# Patient Record
Sex: FEMALE | Race: WHITE | NOT HISPANIC OR LATINO | Employment: FULL TIME | URBAN - METROPOLITAN AREA
[De-identification: names, ages, dates, MRNs, and addresses within clinical notes are randomized per-mention and may not be internally consistent; named-entity substitution may affect disease eponyms.]

---

## 2017-02-23 ENCOUNTER — OFFICE VISIT - HEALTHEAST (OUTPATIENT)
Dept: FAMILY MEDICINE | Facility: CLINIC | Age: 52
End: 2017-02-23

## 2017-02-23 DIAGNOSIS — Z97.5 IUD (INTRAUTERINE DEVICE) IN PLACE: ICD-10-CM

## 2017-02-23 DIAGNOSIS — Z13.9 SCREENING: ICD-10-CM

## 2017-02-23 DIAGNOSIS — F41.9 ANXIETY: ICD-10-CM

## 2017-02-23 ASSESSMENT — MIFFLIN-ST. JEOR: SCORE: 1537.09

## 2017-03-23 ENCOUNTER — OFFICE VISIT - HEALTHEAST (OUTPATIENT)
Dept: FAMILY MEDICINE | Facility: CLINIC | Age: 52
End: 2017-03-23

## 2017-03-23 DIAGNOSIS — F32.89 DEPRESSIVE DISORDER, NOT ELSEWHERE CLASSIFIED: ICD-10-CM

## 2017-03-23 DIAGNOSIS — F41.9 ANXIETY: ICD-10-CM

## 2017-03-23 DIAGNOSIS — Z00.00 ROUTINE GENERAL MEDICAL EXAMINATION AT A HEALTH CARE FACILITY: ICD-10-CM

## 2017-03-23 LAB
CHOLEST SERPL-MCNC: 216 MG/DL
FASTING STATUS PATIENT QL REPORTED: YES
HDLC SERPL-MCNC: 60 MG/DL
LDLC SERPL CALC-MCNC: 145 MG/DL
TRIGL SERPL-MCNC: 53 MG/DL

## 2017-03-23 ASSESSMENT — MIFFLIN-ST. JEOR: SCORE: 1525.86

## 2017-03-30 ENCOUNTER — RECORDS - HEALTHEAST (OUTPATIENT)
Dept: ADMINISTRATIVE | Facility: OTHER | Age: 52
End: 2017-03-30

## 2017-03-31 LAB
HPV INTERPRETATION - HISTORICAL: ABNORMAL
HPV INTERPRETER - HISTORICAL: ABNORMAL

## 2017-04-03 LAB
BKR LAB AP ABNORMAL BLEEDING: NO
BKR LAB AP BIRTH CONTROL/HORMONES: ABNORMAL
BKR LAB AP CERVICAL APPEARANCE: NORMAL
BKR LAB AP GYN ADEQUACY: ABNORMAL
BKR LAB AP GYN INTERPRETATION: ABNORMAL
BKR LAB AP HPV REFLEX: ABNORMAL
BKR LAB AP LMP: ABNORMAL
BKR LAB AP PATIENT STATUS: ABNORMAL
BKR LAB AP PREVIOUS ABNORMAL: NO
BKR LAB AP PREVIOUS NORMAL: NO
HIGH RISK?: NO
PATH REPORT.COMMENTS IMP SPEC: ABNORMAL
RESULT FLAG (HE HISTORICAL CONVERSION): ABNORMAL

## 2017-04-10 ENCOUNTER — AMBULATORY - HEALTHEAST (OUTPATIENT)
Dept: FAMILY MEDICINE | Facility: CLINIC | Age: 52
End: 2017-04-10

## 2017-04-10 DIAGNOSIS — R87.619 ABNORMAL PAP SMEAR OF CERVIX: ICD-10-CM

## 2017-04-17 ENCOUNTER — RECORDS - HEALTHEAST (OUTPATIENT)
Dept: ADMINISTRATIVE | Facility: OTHER | Age: 52
End: 2017-04-17

## 2017-05-30 ENCOUNTER — RECORDS - HEALTHEAST (OUTPATIENT)
Dept: ADMINISTRATIVE | Facility: OTHER | Age: 52
End: 2017-05-30

## 2017-12-05 ENCOUNTER — OFFICE VISIT - HEALTHEAST (OUTPATIENT)
Dept: FAMILY MEDICINE | Facility: CLINIC | Age: 52
End: 2017-12-05

## 2017-12-05 DIAGNOSIS — F41.8 DEPRESSION WITH ANXIETY: ICD-10-CM

## 2017-12-05 DIAGNOSIS — Z12.31 VISIT FOR SCREENING MAMMOGRAM: ICD-10-CM

## 2018-01-11 ENCOUNTER — COMMUNICATION - HEALTHEAST (OUTPATIENT)
Dept: FAMILY MEDICINE | Facility: CLINIC | Age: 53
End: 2018-01-11

## 2018-01-11 DIAGNOSIS — F33.9 MAJOR DEPRESSIVE DISORDER, RECURRENT EPISODE (H): ICD-10-CM

## 2018-01-23 ENCOUNTER — RECORDS - HEALTHEAST (OUTPATIENT)
Dept: ADMINISTRATIVE | Facility: OTHER | Age: 53
End: 2018-01-23

## 2018-02-27 ENCOUNTER — COMMUNICATION - HEALTHEAST (OUTPATIENT)
Dept: ADMINISTRATIVE | Facility: CLINIC | Age: 53
End: 2018-02-27

## 2018-03-15 ENCOUNTER — OFFICE VISIT - HEALTHEAST (OUTPATIENT)
Dept: FAMILY MEDICINE | Facility: CLINIC | Age: 53
End: 2018-03-15

## 2018-03-15 DIAGNOSIS — F33.9 MAJOR DEPRESSIVE DISORDER, RECURRENT EPISODE (H): ICD-10-CM

## 2018-08-08 ENCOUNTER — OFFICE VISIT - HEALTHEAST (OUTPATIENT)
Dept: FAMILY MEDICINE | Facility: CLINIC | Age: 53
End: 2018-08-08

## 2018-08-08 DIAGNOSIS — F33.9 MAJOR DEPRESSIVE DISORDER, RECURRENT EPISODE (H): ICD-10-CM

## 2018-08-08 DIAGNOSIS — F41.9 ANXIETY: ICD-10-CM

## 2018-09-25 ENCOUNTER — COMMUNICATION - HEALTHEAST (OUTPATIENT)
Dept: FAMILY MEDICINE | Facility: CLINIC | Age: 53
End: 2018-09-25

## 2018-09-25 DIAGNOSIS — F33.9 MAJOR DEPRESSIVE DISORDER, RECURRENT EPISODE (H): ICD-10-CM

## 2018-11-29 ENCOUNTER — OFFICE VISIT - HEALTHEAST (OUTPATIENT)
Dept: FAMILY MEDICINE | Facility: CLINIC | Age: 53
End: 2018-11-29

## 2018-11-29 DIAGNOSIS — N95.1 PERIMENOPAUSE: ICD-10-CM

## 2018-11-29 DIAGNOSIS — R87.610 ASCUS WITH POSITIVE HIGH RISK HPV CERVICAL: ICD-10-CM

## 2018-11-29 DIAGNOSIS — F33.1 MODERATE EPISODE OF RECURRENT MAJOR DEPRESSIVE DISORDER (H): ICD-10-CM

## 2018-11-29 DIAGNOSIS — R87.810 ASCUS WITH POSITIVE HIGH RISK HPV CERVICAL: ICD-10-CM

## 2018-11-29 DIAGNOSIS — F41.9 ANXIETY: ICD-10-CM

## 2018-11-29 ASSESSMENT — MIFFLIN-ST. JEOR: SCORE: 1574.4

## 2018-12-02 ENCOUNTER — COMMUNICATION - HEALTHEAST (OUTPATIENT)
Dept: FAMILY MEDICINE | Facility: CLINIC | Age: 53
End: 2018-12-02

## 2018-12-03 ENCOUNTER — AMBULATORY - HEALTHEAST (OUTPATIENT)
Dept: FAMILY MEDICINE | Facility: CLINIC | Age: 53
End: 2018-12-03

## 2018-12-03 ENCOUNTER — AMBULATORY - HEALTHEAST (OUTPATIENT)
Dept: LAB | Facility: CLINIC | Age: 53
End: 2018-12-03

## 2018-12-03 DIAGNOSIS — F41.9 ANXIETY: ICD-10-CM

## 2018-12-03 DIAGNOSIS — F33.1 MODERATE EPISODE OF RECURRENT MAJOR DEPRESSIVE DISORDER (H): ICD-10-CM

## 2018-12-03 DIAGNOSIS — N95.1 PERIMENOPAUSE: ICD-10-CM

## 2018-12-03 LAB
FSH SERPL-ACNC: 73.9 MIU/ML
LH SERPL-ACNC: 30.8 MIU/ML
TSH SERPL DL<=0.005 MIU/L-ACNC: 1.47 UIU/ML (ref 0.3–5)

## 2018-12-20 ENCOUNTER — OFFICE VISIT - HEALTHEAST (OUTPATIENT)
Dept: FAMILY MEDICINE | Facility: CLINIC | Age: 53
End: 2018-12-20

## 2018-12-20 DIAGNOSIS — Z12.31 VISIT FOR SCREENING MAMMOGRAM: ICD-10-CM

## 2018-12-20 DIAGNOSIS — R87.610 ASCUS WITH POSITIVE HIGH RISK HPV CERVICAL: ICD-10-CM

## 2018-12-20 DIAGNOSIS — R87.810 ASCUS WITH POSITIVE HIGH RISK HPV CERVICAL: ICD-10-CM

## 2018-12-20 DIAGNOSIS — F41.9 ANXIETY: ICD-10-CM

## 2018-12-20 DIAGNOSIS — F33.1 MODERATE EPISODE OF RECURRENT MAJOR DEPRESSIVE DISORDER (H): ICD-10-CM

## 2018-12-20 RX ORDER — HYDROXYZINE HYDROCHLORIDE 25 MG/1
25-50 TABLET, FILM COATED ORAL 3 TIMES DAILY PRN
Qty: 60 TABLET | Refills: 4 | Status: SHIPPED | OUTPATIENT
Start: 2018-12-20 | End: 2021-10-13

## 2019-01-25 ENCOUNTER — COMMUNICATION - HEALTHEAST (OUTPATIENT)
Dept: FAMILY MEDICINE | Facility: CLINIC | Age: 54
End: 2019-01-25

## 2019-04-11 ENCOUNTER — COMMUNICATION - HEALTHEAST (OUTPATIENT)
Dept: ADMINISTRATIVE | Facility: CLINIC | Age: 54
End: 2019-04-11

## 2019-04-26 ENCOUNTER — OFFICE VISIT - HEALTHEAST (OUTPATIENT)
Dept: FAMILY MEDICINE | Facility: CLINIC | Age: 54
End: 2019-04-26

## 2019-04-26 DIAGNOSIS — R87.810 ASCUS WITH POSITIVE HIGH RISK HPV CERVICAL: ICD-10-CM

## 2019-04-26 DIAGNOSIS — R87.610 ASCUS WITH POSITIVE HIGH RISK HPV CERVICAL: ICD-10-CM

## 2019-04-26 DIAGNOSIS — F33.1 MODERATE EPISODE OF RECURRENT MAJOR DEPRESSIVE DISORDER (H): ICD-10-CM

## 2019-04-26 DIAGNOSIS — F41.9 ANXIETY: ICD-10-CM

## 2020-01-19 ENCOUNTER — COMMUNICATION - HEALTHEAST (OUTPATIENT)
Dept: FAMILY MEDICINE | Facility: CLINIC | Age: 55
End: 2020-01-19

## 2020-01-19 DIAGNOSIS — F33.1 MODERATE EPISODE OF RECURRENT MAJOR DEPRESSIVE DISORDER (H): ICD-10-CM

## 2020-01-19 DIAGNOSIS — F41.9 ANXIETY: ICD-10-CM

## 2020-02-28 ENCOUNTER — COMMUNICATION - HEALTHEAST (OUTPATIENT)
Dept: FAMILY MEDICINE | Facility: CLINIC | Age: 55
End: 2020-02-28

## 2020-02-28 DIAGNOSIS — F41.9 ANXIETY: ICD-10-CM

## 2020-02-28 DIAGNOSIS — F33.1 MODERATE EPISODE OF RECURRENT MAJOR DEPRESSIVE DISORDER (H): ICD-10-CM

## 2020-03-10 ENCOUNTER — OFFICE VISIT - HEALTHEAST (OUTPATIENT)
Dept: FAMILY MEDICINE | Facility: CLINIC | Age: 55
End: 2020-03-10

## 2020-03-10 DIAGNOSIS — F41.9 ANXIETY: ICD-10-CM

## 2020-03-10 DIAGNOSIS — N95.1 PERIMENOPAUSE: ICD-10-CM

## 2020-03-10 DIAGNOSIS — Z12.4 SCREENING FOR CERVICAL CANCER: ICD-10-CM

## 2020-03-10 DIAGNOSIS — R87.810 ASCUS WITH POSITIVE HIGH RISK HPV CERVICAL: ICD-10-CM

## 2020-03-10 DIAGNOSIS — R87.610 ASCUS WITH POSITIVE HIGH RISK HPV CERVICAL: ICD-10-CM

## 2020-03-10 DIAGNOSIS — F33.1 MODERATE EPISODE OF RECURRENT MAJOR DEPRESSIVE DISORDER (H): ICD-10-CM

## 2020-03-10 DIAGNOSIS — Z00.00 ROUTINE GENERAL MEDICAL EXAMINATION AT A HEALTH CARE FACILITY: ICD-10-CM

## 2020-03-10 RX ORDER — CITALOPRAM HYDROBROMIDE 10 MG/1
TABLET ORAL
Qty: 90 TABLET | Refills: 2 | Status: SHIPPED | OUTPATIENT
Start: 2020-03-10 | End: 2021-10-13

## 2020-03-10 ASSESSMENT — ANXIETY QUESTIONNAIRES
2. NOT BEING ABLE TO STOP OR CONTROL WORRYING: MORE THAN HALF THE DAYS
GAD7 TOTAL SCORE: 11
5. BEING SO RESTLESS THAT IT IS HARD TO SIT STILL: SEVERAL DAYS
IF YOU CHECKED OFF ANY PROBLEMS ON THIS QUESTIONNAIRE, HOW DIFFICULT HAVE THESE PROBLEMS MADE IT FOR YOU TO DO YOUR WORK, TAKE CARE OF THINGS AT HOME, OR GET ALONG WITH OTHER PEOPLE: SOMEWHAT DIFFICULT
7. FEELING AFRAID AS IF SOMETHING AWFUL MIGHT HAPPEN: MORE THAN HALF THE DAYS
3. WORRYING TOO MUCH ABOUT DIFFERENT THINGS: SEVERAL DAYS
1. FEELING NERVOUS, ANXIOUS, OR ON EDGE: MORE THAN HALF THE DAYS
6. BECOMING EASILY ANNOYED OR IRRITABLE: MORE THAN HALF THE DAYS
4. TROUBLE RELAXING: SEVERAL DAYS

## 2020-03-10 ASSESSMENT — PATIENT HEALTH QUESTIONNAIRE - PHQ9: SUM OF ALL RESPONSES TO PHQ QUESTIONS 1-9: 14

## 2020-03-10 ASSESSMENT — MIFFLIN-ST. JEOR: SCORE: 1567.14

## 2020-03-11 LAB
HPV SOURCE: ABNORMAL
HUMAN PAPILLOMA VIRUS 16 DNA: POSITIVE
HUMAN PAPILLOMA VIRUS 18 DNA: NEGATIVE
HUMAN PAPILLOMA VIRUS FINAL DIAGNOSIS: ABNORMAL
HUMAN PAPILLOMA VIRUS OTHER HR: POSITIVE
SPECIMEN DESCRIPTION: ABNORMAL

## 2020-03-19 LAB
BKR LAB AP ABNORMAL BLEEDING: NO
BKR LAB AP BIRTH CONTROL/HORMONES: ABNORMAL
BKR LAB AP CERVICAL APPEARANCE: NORMAL
BKR LAB AP GYN ADEQUACY: ABNORMAL
BKR LAB AP GYN INTERPRETATION: ABNORMAL
BKR LAB AP HPV REFLEX: ABNORMAL
BKR LAB AP LMP: ABNORMAL
BKR LAB AP PATIENT STATUS: ABNORMAL
BKR LAB AP PREVIOUS ABNORMAL: ABNORMAL
BKR LAB AP PREVIOUS NORMAL: 2013
HIGH RISK?: NO
PATH REPORT.COMMENTS IMP SPEC: ABNORMAL
RESULT FLAG (HE HISTORICAL CONVERSION): ABNORMAL

## 2020-03-24 ENCOUNTER — COMMUNICATION - HEALTHEAST (OUTPATIENT)
Dept: FAMILY MEDICINE | Facility: CLINIC | Age: 55
End: 2020-03-24

## 2020-03-26 ENCOUNTER — OFFICE VISIT - HEALTHEAST (OUTPATIENT)
Dept: FAMILY MEDICINE | Facility: CLINIC | Age: 55
End: 2020-03-26

## 2020-03-26 DIAGNOSIS — Z30.432 ENCOUNTER FOR IUD REMOVAL: ICD-10-CM

## 2020-03-26 DIAGNOSIS — Z00.00 ROUTINE GENERAL MEDICAL EXAMINATION AT A HEALTH CARE FACILITY: ICD-10-CM

## 2020-03-26 LAB
CHOLEST SERPL-MCNC: 281 MG/DL
ERYTHROCYTE [DISTWIDTH] IN BLOOD BY AUTOMATED COUNT: 13 % (ref 11–14.5)
FASTING STATUS PATIENT QL REPORTED: YES
FASTING STATUS PATIENT QL REPORTED: YES
GLUCOSE BLD-MCNC: 77 MG/DL (ref 70–99)
HCT VFR BLD AUTO: 42.1 % (ref 35–47)
HDLC SERPL-MCNC: 62 MG/DL
HGB BLD-MCNC: 13.9 G/DL (ref 12–16)
LDLC SERPL CALC-MCNC: 206 MG/DL
MCH RBC QN AUTO: 29.4 PG (ref 27–34)
MCHC RBC AUTO-ENTMCNC: 33 G/DL (ref 32–36)
MCV RBC AUTO: 89 FL (ref 80–100)
PLATELET # BLD AUTO: 239 THOU/UL (ref 140–440)
PMV BLD AUTO: 10.9 FL (ref 8.5–12.5)
RBC # BLD AUTO: 4.73 MILL/UL (ref 3.8–5.4)
TRIGL SERPL-MCNC: 66 MG/DL
WBC: 5.9 THOU/UL (ref 4–11)

## 2020-06-24 ENCOUNTER — HOSPITAL ENCOUNTER (OUTPATIENT)
Dept: MAMMOGRAPHY | Facility: CLINIC | Age: 55
Discharge: HOME | End: 2020-06-24
Attending: FAMILY MEDICINE

## 2020-06-24 DIAGNOSIS — Z00.00 ROUTINE GENERAL MEDICAL EXAMINATION AT A HEALTH CARE FACILITY: ICD-10-CM

## 2020-08-06 ENCOUNTER — COMMUNICATION - HEALTHEAST (OUTPATIENT)
Dept: FAMILY MEDICINE | Facility: CLINIC | Age: 55
End: 2020-08-06

## 2021-02-09 ENCOUNTER — COMMUNICATION - HEALTHEAST (OUTPATIENT)
Dept: SCHEDULING | Facility: CLINIC | Age: 56
End: 2021-02-09

## 2021-03-04 ENCOUNTER — COMMUNICATION - HEALTHEAST (OUTPATIENT)
Dept: FAMILY MEDICINE | Facility: CLINIC | Age: 56
End: 2021-03-04

## 2021-03-04 DIAGNOSIS — F33.1 MODERATE EPISODE OF RECURRENT MAJOR DEPRESSIVE DISORDER (H): ICD-10-CM

## 2021-03-04 DIAGNOSIS — F41.9 ANXIETY: ICD-10-CM

## 2021-03-04 RX ORDER — CITALOPRAM HYDROBROMIDE 20 MG/1
TABLET ORAL
Qty: 90 TABLET | Refills: 0 | Status: SHIPPED | OUTPATIENT
Start: 2021-03-04 | End: 2021-10-13

## 2021-04-13 ENCOUNTER — OFFICE VISIT - HEALTHEAST (OUTPATIENT)
Dept: FAMILY MEDICINE | Facility: CLINIC | Age: 56
End: 2021-04-13

## 2021-04-13 DIAGNOSIS — F33.1 MODERATE EPISODE OF RECURRENT MAJOR DEPRESSIVE DISORDER (H): ICD-10-CM

## 2021-04-13 DIAGNOSIS — F41.9 ANXIETY: ICD-10-CM

## 2021-04-13 DIAGNOSIS — R87.610 ASCUS WITH POSITIVE HIGH RISK HPV CERVICAL: ICD-10-CM

## 2021-04-13 DIAGNOSIS — R87.810 ASCUS WITH POSITIVE HIGH RISK HPV CERVICAL: ICD-10-CM

## 2021-04-13 DIAGNOSIS — Z78.0 POST-MENOPAUSE: ICD-10-CM

## 2021-04-13 RX ORDER — BUPROPION HYDROCHLORIDE 150 MG/1
150 TABLET ORAL EVERY MORNING
Qty: 30 TABLET | Refills: 2 | Status: SHIPPED | OUTPATIENT
Start: 2021-04-13 | End: 2022-10-03 | Stop reason: DRUGHIGH

## 2021-04-13 ASSESSMENT — ANXIETY QUESTIONNAIRES
4. TROUBLE RELAXING: SEVERAL DAYS
5. BEING SO RESTLESS THAT IT IS HARD TO SIT STILL: SEVERAL DAYS
IF YOU CHECKED OFF ANY PROBLEMS ON THIS QUESTIONNAIRE, HOW DIFFICULT HAVE THESE PROBLEMS MADE IT FOR YOU TO DO YOUR WORK, TAKE CARE OF THINGS AT HOME, OR GET ALONG WITH OTHER PEOPLE: SOMEWHAT DIFFICULT
3. WORRYING TOO MUCH ABOUT DIFFERENT THINGS: MORE THAN HALF THE DAYS
6. BECOMING EASILY ANNOYED OR IRRITABLE: MORE THAN HALF THE DAYS
1. FEELING NERVOUS, ANXIOUS, OR ON EDGE: SEVERAL DAYS
2. NOT BEING ABLE TO STOP OR CONTROL WORRYING: MORE THAN HALF THE DAYS
GAD7 TOTAL SCORE: 10
7. FEELING AFRAID AS IF SOMETHING AWFUL MIGHT HAPPEN: SEVERAL DAYS

## 2021-04-13 ASSESSMENT — PATIENT HEALTH QUESTIONNAIRE - PHQ9: SUM OF ALL RESPONSES TO PHQ QUESTIONS 1-9: 10

## 2021-05-21 ENCOUNTER — RECORDS - HEALTHEAST (OUTPATIENT)
Dept: ADMINISTRATIVE | Facility: OTHER | Age: 56
End: 2021-05-21

## 2021-05-27 ASSESSMENT — PATIENT HEALTH QUESTIONNAIRE - PHQ9
SUM OF ALL RESPONSES TO PHQ QUESTIONS 1-9: 10
SUM OF ALL RESPONSES TO PHQ QUESTIONS 1-9: 14

## 2021-05-28 ASSESSMENT — ANXIETY QUESTIONNAIRES
GAD7 TOTAL SCORE: 10
GAD7 TOTAL SCORE: 11

## 2021-05-28 NOTE — PROGRESS NOTES
"Assessment/plan   Kaley Acevedo is a 53 y.o. female who is established to my practice.    Kaley was seen today for medication refill.    Diagnoses and all orders for this visit:    Moderate episode of recurrent major depressive disorder (H)  Anxiety  PHQ-9 Total Score: 10 (4/26/2019 12:00 PM)    NEENA-7 Total: 10 (4/26/2019 12:00 PM)  NEENA 7 Total Score: 6 (12/20/2018  3:00 PM)    Reports stability in her symptoms.   Reviewed options for hormone replacement therapy given her mood sx, hot flashes, weight gain, etc. She will think about these.   Mindfulness reviewed.  Encouraged exercise.  Encouraged reconsideration for counseling but at this time she declines.  Refill sent of her Celexa and patient understands she is taking this 30 mg and 20 mg on alternating days.  She is going to consider the hydroxyzine this time.    ASCUS with positive high risk HPV cervical  3/2017 pap ASCUS with HPV+  4/17/17- colposcopy with [facility] *see media tab; showed normal visually and bx reported \"cells with HPV effect, very mild, nondiagnostic for dysplasia\".    - Discussed with pt today and she agrees to have pap/cotesting repeated as this should have been done 1 year after colp.        I spent 25 minutes with the patient, >50% of which was in counseling regarding patient's medical issues as noted above.    Follow up: anytime for pap    Leatha Bernal MD    Subjective:      HPI: Kaley Acevedo is a 53 y.o. female who is here for:    Chief Complaint   Patient presents with     Medication Refill     no concerns        Med check:   Started walking more now that the weather is warmer.   She states taking the Celexa at 30 mg every single day because worsening in her apathy.  So for the past month she has been doing this alternating dosing: Has been taking Celexa 30mg every other day and 20mg on alternating days for the past month and going better.    Counseling because this has not gone well for her in the past.  She has not tried the " hydroxyzine but does recall picking it up.  Denies suicidal thoughts/ideations.   She is mostly frustrated with her hormonal mood swings and hot flashes.  She is not yet ready for hormone replacement therapy but interested to hear a little bit more about this today.    Discussed her abnormal Pa/colp from 2017, due for retesting, and she is agreeable to come back in a few weeks for this.    Review of Systems:  12 point comprehensive review of systems was negative except as noted and HPI     Social History:  Social History     Social History Narrative     but currently in another relationship. Works at [employer]. 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 No tobacco use. Drinks 1-3 alcoholic beverages a week.     Letaha Bernal MD       Medical History:  Patient Active Problem List   Diagnosis     Urge And Stress Incontinence     Hyperlipidemia     Obesity     Chronic Major Depression     Anxiety     IUD (intrauterine device) in place     Family history of colon cancer     Polyp of colon     ASCUS with positive high risk HPV cervical     Past Medical History:   Diagnosis Date     Anxiety      Benign Adenomatous Polyp Of The Large Intestine     Created by [facility] Annotation: Mar 26 2007  1:03PM - Vivian Alvarado: colonscopy Q 5  years      Depression      Hypercholesteremia      Past Surgical History:   Procedure Laterality Date     APPENDECTOMY  spring 2011      SECTION  1991     [place]  DELIVERY ONLY      Description:  Section;  Recorded: 2008;     NV HEMORRHOIDECTOMY INTERNAL RUBBER BAND LIGATIONS      Description: Hemorrhoidectomy;  Recorded: 2008;     Patient has no known allergies.  Family History   Problem Relation Age of Onset     Myelodysplastic syndrome Father      Skin cancer Mother      Diabetes Mother      Breast cancer Mother      Hypertension Sister      Hyperlipidemia Sister      Colon cancer Sister 32          6 months later     Hypertension Brother      Hyperlipidemia Brother        Medications:  Current Outpatient Medications   Medication Sig Dispense Refill     citalopram (CELEXA) 10 MG tablet Take 10mg with the 20mg dose for total of 30mg daily. 90 tablet 2     citalopram (CELEXA) 20 MG tablet Take 20mg with the 10mg tablet for total of 30mg daily. 90 tablet 2     LEVONORGESTREL (MIRENA UTRN) by Intrauterine route.       multivitamin capsule Take 1 capsule by mouth daily.       cholecalciferol, vitamin D3, 1,000 unit tablet Take 1,000 Units by mouth daily.       hydrOXYzine HCl (ATARAX) 25 MG tablet Take 1-2 tablets (25-50 mg total) by mouth 3 (three) times a day as needed for anxiety. 60 tablet 4     No current facility-administered medications for this visit.        Imaging & Labs reviewed this visit: pap as described in HPI and A/P      Objective:      Vitals:    19 1146   BP: 116/70   Pulse: 60       Physical Exam:     General: Alert, no acute distress.   HEENT: normocephalic conjunctivae are clear. EOMI  Neck: supple   Lungs: Normal effort  Heart: regular rate  Abdomen: soft   Skin: clear without rash or lesions  Neuro: alert, oriented  Psych: mood good, affect appropriate, good eye contact, insight and judgment intact, normal speech pattern.    PHQ9 score PHQ-9 Total Score: 10 (2019 12:00 PM)    Little interest or pleasure in doing things: Several days  Feeling down, depressed, or hopeless: Several days  Trouble falling or staying asleep, or sleeping too much: More than half the days  Feeling tired or having little energy: More than half the days  Poor appetite or overeating: More than half the days  Feeling bad about yourself - or that you are a failure or have let yourself or your family down: Several days  Trouble concentrating on things, such as reading the newspaper or watching television: Several days  Moving or speaking so slowly that other people could have noticed. Or the opposite -  being so fidgety or restless that you have been moving around a lot more than usual: Not at all  Thoughts that you would be better off dead, or of hurting yourself in some way: Not at all  PHQ-9 Total Score: 10    GAD7 score NEENA-7 Total: 10 (4/26/2019 12:00 PM)  NEENA 7 Total Score: 6 (12/20/2018  3:00 PM)    No data recorded      Leatha Bernal MD

## 2021-05-28 NOTE — PATIENT INSTRUCTIONS - HE
Hormone Replacement Therapy (HRT):  In the past, MHT was also often prescribed for prevention of coronary heart disease and osteoporosis, however in analysis of 18 trials HRT was associated with a number of adverse outcomes, including an excess risk of coronary heart disease, stroke, venous thromboembolism (DVT and pulmonary embolism), and breast cancer.  Many expert groups do suggest HRT for younger postmenopausal women (age 50-59) with moderate to severe symptoms and no contraindications to estrogen use. Women should be reassured that the absolute risk of complications for healthy, young postmenopausal women taking HRT for five years is very low.    For young post-menopausal/perimenopausal women:  Combined estrogen-progestin therapy - Number of cases (additional or fewer) per 1000 women per five years of hormone use when compared with placebo:   - Coronary heart disease (CHD) - 2.5 additional cases   - Invasive breast cancer - 3 additional cases   - Stroke - 2.5 additional cases   - Pulmonary embolism - 3 additional cases   - Colorectal cancer - 0.5 fewer cases   - Endometrial cancer - no difference   - Hip fracture - 1.5 fewer cases   - All-cause mortality - 5 fewer events  Estrogen-alone therapy - Number of cases (additional or fewer) per 1000 women per five years of hormone use when compared with placebo:   - CHD - 5.5 fewer cases   - Invasive breast cancer - 2.5 fewer cases   - Stroke - 0.5 fewer cases   - Pulmonary embolism - 1.5 additional cases   - Colorectal cancer - 0.5 fewer cases   - Hip fracture - 1.5 additional cases   - All-cause mortality - 5.5 fewer events    COGNITIVE FUNCTION AND DEMENTIA:  - Although some epidemiologic studies suggested that estrogen may preserve cognitive function and prevent dementia, data from the Women's Health Initiative did not support these observations.   GALLBLADDER DISEASE:  - A secondary analysis of data from the Women's Health Initiative found a significantly  increased risk of biliary tract disease among women using oral estrogen therapy  OSTEOPOROTIC FRACTURE   - The risk of osteoporotic fracture with combined hormone therapy versus placebo was reduced   - For women ages 50 to 59 years, the group most likely to be taking HT, the estimates of benefit in one analysis were 4.9 and 5.9 fewer fractures per 1000 women per five years of combined estrogen-progestin or unopposed estrogen use, respectively  TYPE 2 DIABETES MELLITUS   - Combined HRT appears to reduce the risk of type 2 diabetes mellitus, possibly mediated by a decrease in insulin resistance unrelated to body size. However, this effect is insufficient to recommend HT as a diabetes prevention strategy in women with CHD.  SKIN  - Some clinicians believe that estrogen helps to preserve the thickness and the collagen content of skin in postmenopausal women, but two four-year, randomized trials have reported no differences between MHT and placebo on age-related skin changes (wrinkling and skin rigidity)   WEIGHT  - There is no evidence of an effect of unopposed estrogen or combined estrogen-progestin on body weight or body mass index

## 2021-05-30 ENCOUNTER — RECORDS - HEALTHEAST (OUTPATIENT)
Dept: ADMINISTRATIVE | Facility: CLINIC | Age: 56
End: 2021-05-30

## 2021-05-30 VITALS — BODY MASS INDEX: 28.42 KG/M2 | WEIGHT: 191.9 LBS | HEIGHT: 69 IN

## 2021-05-30 VITALS — WEIGHT: 193.5 LBS | HEIGHT: 69 IN | BODY MASS INDEX: 28.66 KG/M2

## 2021-05-31 VITALS — BODY MASS INDEX: 29.51 KG/M2 | WEIGHT: 198.4 LBS

## 2021-06-02 ENCOUNTER — RECORDS - HEALTHEAST (OUTPATIENT)
Dept: ADMINISTRATIVE | Facility: CLINIC | Age: 56
End: 2021-06-02

## 2021-06-02 VITALS — HEIGHT: 69 IN | BODY MASS INDEX: 30.01 KG/M2 | WEIGHT: 202.6 LBS

## 2021-06-04 VITALS
RESPIRATION RATE: 16 BRPM | WEIGHT: 201 LBS | BODY MASS INDEX: 29.77 KG/M2 | OXYGEN SATURATION: 100 % | DIASTOLIC BLOOD PRESSURE: 78 MMHG | HEIGHT: 69 IN | HEART RATE: 57 BPM | SYSTOLIC BLOOD PRESSURE: 110 MMHG

## 2021-06-04 VITALS
HEART RATE: 56 BPM | WEIGHT: 196.6 LBS | BODY MASS INDEX: 29.24 KG/M2 | DIASTOLIC BLOOD PRESSURE: 62 MMHG | SYSTOLIC BLOOD PRESSURE: 108 MMHG

## 2021-06-05 VITALS — HEART RATE: 72 BPM | DIASTOLIC BLOOD PRESSURE: 72 MMHG | SYSTOLIC BLOOD PRESSURE: 126 MMHG

## 2021-06-05 NOTE — TELEPHONE ENCOUNTER
Refill Approved    Rx renewed per Medication Renewal Policy. Medication was last renewed on 4/26/19.    Francesca Broderick, [facility] Triage/Med Refill 1/20/2020     Requested Prescriptions   Pending Prescriptions Disp Refills     citalopram (CELEXA) 20 MG tablet [Pharmacy Med Name: CITALOPRAM HBR 20 MG TABLET] 90 tablet 2     Sig: TAKE 1 TABLET BY MOUTH, WITH THE 10MG TABLET FOR TOTAL OF 30MG DAILY.       SSRI Refill Protocol  Passed - 1/19/2020  1:14 AM        Passed - PCP or prescribing provider visit in last year     Last office visit with prescriber/PCP: 4/26/2019 Leatha Bernal MD OR same dept: 4/26/2019 Leatha Bernal MD OR same specialty: 4/26/2019 Leatha Bernal MD  Last physical: Visit date not found Last MTM visit: Visit date not found   Next visit within 3 mo: Visit date not found  Next physical within 3 mo: Visit date not found  Prescriber OR PCP: Leatha Bernal MD  Last diagnosis associated with med order: 1. Moderate episode of recurrent major depressive disorder (H)  - citalopram (CELEXA) 20 MG tablet [Pharmacy Med Name: CITALOPRAM HBR 20 MG TABLET]; TAKE 1 TABLET BY MOUTH, WITH THE 10MG TABLET FOR TOTAL OF 30MG DAILY.  Dispense: 90 tablet; Refill: 2    2. Anxiety  - citalopram (CELEXA) 20 MG tablet [Pharmacy Med Name: CITALOPRAM HBR 20 MG TABLET]; TAKE 1 TABLET BY MOUTH, WITH THE 10MG TABLET FOR TOTAL OF 30MG DAILY.  Dispense: 90 tablet; Refill: 2    If protocol passes may refill for 12 months if within 3 months of last provider visit (or a total of 15 months).

## 2021-06-06 NOTE — TELEPHONE ENCOUNTER
Refill Approved    Rx renewed per Medication Renewal Policy. Medication was last renewed on 4/26/19.    Francesca Broderick, [facility] Triage/Med Refill 2/28/2020     Requested Prescriptions   Pending Prescriptions Disp Refills     citalopram (CELEXA) 10 MG tablet [Pharmacy Med Name: CITALOPRAM HBR 10 MG TABLET] 90 tablet 2     Sig: TAKE 1 TABLET BY MOUTH ONCE DAILY, WITH THE 20MG DOSE FOR A TOTAL OF 30MG DAILY.       SSRI Refill Protocol  Passed - 2/28/2020  1:49 AM        Passed - PCP or prescribing provider visit in last year     Last office visit with prescriber/PCP: 4/26/2019 Leatha Bernal MD OR same dept: 4/26/2019 Leatha Bernal MD OR same specialty: 4/26/2019 Leatha Bernal MD  Last physical: Visit date not found Last MTM visit: Visit date not found   Next visit within 3 mo: Visit date not found  Next physical within 3 mo: Visit date not found  Prescriber OR PCP: Leatha Bernal MD  Last diagnosis associated with med order: 1. Moderate episode of recurrent major depressive disorder (H)  - citalopram (CELEXA) 10 MG tablet [Pharmacy Med Name: CITALOPRAM HBR 10 MG TABLET]; TAKE 1 TABLET BY MOUTH ONCE DAILY, WITH THE 20MG DOSE FOR A TOTAL OF 30MG DAILY.  Dispense: 90 tablet; Refill: 2    2. Anxiety  - citalopram (CELEXA) 10 MG tablet [Pharmacy Med Name: CITALOPRAM HBR 10 MG TABLET]; TAKE 1 TABLET BY MOUTH ONCE DAILY, WITH THE 20MG DOSE FOR A TOTAL OF 30MG DAILY.  Dispense: 90 tablet; Refill: 2    If protocol passes may refill for 12 months if within 3 months of last provider visit (or a total of 15 months).

## 2021-06-06 NOTE — PATIENT INSTRUCTIONS - HE
There is a new shingles vaccine that is 97% effective. It is the Shingrix vaccine and is recommended for those 50 and older. We recommend the vaccine even if you have had shingles or the older vaccine against shingles- Zostavax. Insurance coverage for the vaccine varies. I recommend you check with your insurance to verify if, when and where it is covered. The vaccine is covered by most commercial insurance but Medicare does not cover the vaccine. It may be covered by Medicare Part D (your drug/pharmacy plan) and sometimes it is covered only at a pharmacy instead of here in the clinic. If it is covered in the clinic, you may schedule a nurse visit anytime to get the first dose of the vaccine. The second dose is recommended two months after the first and should be gotten by 6 months after the first.   About 10% of people will get a sore, red reaction at the site of the injection. Some people may also feel a little sick or nauseated after the injection. This may happen with either the first and/or second vaccine.

## 2021-06-07 NOTE — TELEPHONE ENCOUNTER
----- Message from Leatha Bernal MD sent at 3/23/2020  7:42 PM CDT -----  Needs colposcopy- ASCUS with HPV16 positive (with other HR HPV also positive). Per current recs, deferring colposcopy due to COVID19 pandemic.     Please update HCM and reach out to pt in the fall to schedule colposcopy/repeat pap. Thanks, Leatha Bernal MD

## 2021-06-07 NOTE — TELEPHONE ENCOUNTER
Patient Returning Call  Reason for call:  Patient called back.  Information relayed to patient:  Informed of message listed below.  Patient states she has an appointment this Thursday and will find out more regarding the results that was read to her then.  Patient has additional questions:  No  If YES, what are your questions/concerns:    Okay to leave a detailed message?: No call back needed

## 2021-06-09 NOTE — PROGRESS NOTES
ASSESSMENT & PLAN:  1. Anxiety    2. IUD (intrauterine device) in place    3. Screening  - Ambulatory referral for Colonoscopy    Discussed options with her.  She does not want to try anything new for now.  We'll start her on citalopram 10 mg daily.  Follow-up in one month.  If she does not have good response from this, we also discussed about BuSpar.  Does not feel comfortable considering psychotherapy.  Continue to reach out to her partner who has been supportive.  She was also encouraged to have her colonoscopy done.  She was agreeable with the plans.     Orders Placed This Encounter   Procedures     Ambulatory referral for Colonoscopy     Referral Priority:   Routine     Referral Type:   Colonoscopy     Referral Reason:   Evaluation and Treatment     Referral Location:   [facility]     Requested Specialty:   Gastroenterology     Number of Visits Requested:   1     Medications Discontinued During This Encounter   Medication Reason     escitalopram oxalate (LEXAPRO) 10 MG tablet Therapy completed        CHIEF COMPLAINT:  Chief Complaint   Patient presents with     Follow-up     Med check- stopped taking Lexapro about 2 months ago, discuss restarting.      Referral     [facility] for colonoscopy, last done 03/22/07.         HISTORY OF PRESENT ILLNESS:  Kaley is a 51 y.o. female presenting to the clinic today to revisit her anxiety and the possibility of medication. She has gotten  and changed jobs within the past year, both of which have been positive changes. She has learned how to manage certain things in her life at certain times, but she still struggles with her anxiety; she has persistent thoughts of self-doubt which are hard to control. She has managed her depression better in the past year, but she is struggling with her anxiety especially at work. She is enjoying her new position at work, but she is having feelings of self-doubt; she has a hard time stopping the cycle of self-doubt, but  only some of the time. She feels she needs help managing it. She is able to function at work and in her relationships. When she is having anxiety, she feels that she needs to get everything done today which she realized is irrational. Her anxiety is not getting worse with time; she feels she is overall better but she can tell that she might get worse without help or medication at this time. She has somebody she can talk to now, a new relationship; she has never talked to anyone and she realizes it is helpful. She has gotten involved in new activities and connected with old friends in the past year. She tried to see a therapist but she did not do well as she had a hard time opening up. She was taking Lexapro to help with her anxiety; she was taking 20 mg at one time but it made her feel emotionally dead. She felt her emotions were numbed at Lexapro 10 mg daily but it was helpful with the anxiety and conversations in her head. She was taking 10 mg every other day but then she was not taking anything for the past 5-6 weeks. She does not want to numb her emotions. She has tried many different medications over time. She denies any concerning side effects from Lexapro, but she felt very uncontrollably emotional when she was taking Wellbutrin. She has also tried Prozac, Xanax, and Celexa. She thinks she took Celexa for a while, and she does not remember any concerning side effects. She thinks she may have tried Zoloft when she was first diagnosed. She would like to try something other than Lexapro to see if there is a product that will not make her emotionally numb; she is nervous to try something too new or untested.  PHQ9 score of 7, GAD7 of 10.     Menorrhagia: She had an IUD placed which has helped with her menorrhagia; she still has some bleeding due to her cyst, but the bleeding is no longer every day.     Health Maintenance: She is going to schedule a colonoscopy this year; her last colonoscopy was done in 2007 and  "she was due for a repeat in 5 years.     REVIEW OF SYSTEMS:   She denies any heart rhythm abnormalities, changes in her stool, abdominal pain, loss of interest or motivation, or thoughts of self-harm. All other systems are negative.     PFSH:     TOBACCO USE:  History   Smoking Status     Never Smoker   Smokeless Tobacco     Never Used        VITALS:  Vitals:    02/23/17 1142   BP: 110/70   Patient Site: Left Arm   Patient Position: Sitting   Cuff Size: Adult Regular   Pulse: (!) 53   SpO2: 100%   Weight: 193 lb 8 oz (87.8 kg)   Height: 5' 9\" (1.753 m)     Wt Readings from Last 3 Encounters:   02/23/17 193 lb 8 oz (87.8 kg)   06/30/16 199 lb 4.8 oz (90.4 kg)   05/16/16 202 lb 11.2 oz (91.9 kg)     Body mass index is 28.57 kg/(m^2).     PHYSICAL EXAM:  Visit Vitals     /70 (Patient Site: Left Arm, Patient Position: Sitting, Cuff Size: Adult Regular)     Pulse (!) 53     Ht 5' 9\" (1.753 m)     Wt 193 lb 8 oz (87.8 kg)     SpO2 100%     Breastfeeding No     BMI 28.57 kg/m2       Vital signs noted above. AAO ×3.  HEENT no nasal discharge, moist oral mucosa.  Neck: Supple neck, nonpalpable cervical lymph nodes. No thyromegaly. Lungs: Clear to auscultation bilateral.  Heart: S1-S2 regular rate and rhythm, no murmurs were noted.  Abdomen: with bowel sounds.  Extremities: pulses were full and equal. Psych: Appears well and appropriate for age.  Mood and insight appropriate.  No flight of ideas.    DATA REVIEWED:  ADDITIONAL HISTORY SUMMARIZED (2): None.   DECISION TO OBTAIN EXTRA INFORMATION (1): None.   RADIOLOGY TESTS (1): None.  LABS (1): None.  MEDICINE TESTS (1): None.  INDEPENDENT REVIEW (2 each): None.     The visit lasted a total of 15 minutes face to face with the patient. Over 50% of the time was spent counseling and educating the patient about her anxiety and medications.     Marcella SPANN, am scribing for and in the presence of Dr. Black.  IDr. Black, personally performed the services described in this " documentation, as scribed by Marcella Brooks in my presence, and it is both accurate and complete.     MEDICATIONS:  Current Outpatient Prescriptions   Medication Sig Dispense Refill     cholecalciferol, vitamin D3, 1,000 unit tablet Take 1,000 Units by mouth daily.       multivitamin capsule Take 1 capsule by mouth daily.       citalopram (CELEXA) 10 MG tablet Take 1 tablet (10 mg total) by mouth daily. 30 tablet 0     No current facility-administered medications for this visit.         Total data points: 0

## 2021-06-09 NOTE — PROGRESS NOTES
ASSESSMENT:  1. Routine general medical examination at a health care facility  - Lipid Cascade  - HM2(CBC w/o Differential)  - Glucose  - Gynecologic Cytology (PAP Smear)    2. Depression    3. Anxiety    She is fasting.  We will do labs.  Pap smear was also done.  Consider mammogram.  Colonoscopy next week.  Encourage annual physical.  Continue to improve food choices, continue with regular exercise.  We will increase her citalopram to 15 mg daily.  Recheck in 3 month, earlier if symptoms worse.  We will provide her Td today.  She was agreeable with the plans.    PLAN:  Patient Instructions   Pap smear today with HPV test.  If both are normal you can have your next pap in 5 years.  Results can be seen through MyChart in about 5-7 days.     Due for mammogram.  This should be done once a year.  Colonoscopy scheduled for next week.     Exam is normal aside from some blood on pelvic exam.  This is expected since you have your period.  Labs today to look at blood sugar, cholesterol, and overall count.     Try to keep a balanced diet with lots of fruits and vegetables.  Get lots of regular exercise.     Increase citalopram to 15 mg daily (one and a half tablets daily).  If this improves symptoms then continue and see me in 3 months.  Do not stop it abruptly even if you feel great.  See me sooner if symptoms worsen.       Orders Placed This Encounter   Procedures     Td, Adult, Adsorbed (blue label)     Lipid Cascade     Order Specific Question:   Fasting is required?     Answer:   Yes     HM2(CBC w/o Differential)     Glucose     Medications Discontinued During This Encounter   Medication Reason     citalopram (CELEXA) 10 MG tablet Reorder       No Follow-up on file.    CHIEF COMPLAINT:  Chief Complaint   Patient presents with     Annual Exam     Pt is FASTING today. Last pap smear done 09/13/13- pt denies any h/o a abnormal pap smear.      Follow-up     Med check- refill Celexa.      Immunizations     Td.        HISTORY  OF PRESENT ILLNESS:  Kaley is a 51 y.o. female presenting to the clinic today for a physical examination.     Vaginal Bleeding: She has vaginal bleeding and discharge inconsistently for the last 3 months. She has been bleeding for three and a half weeks currently. She had an IUD inserted last August and regularity improved until recently. Periods are lighter but constant. She has not seen her gynecologist. She has some pelvic pain related to a cyst but nothing new. No new blood thinners or herbal supplements.    Anxiety: She started citalopram a month ago which seems to be helping. She still feels anxious and worries but not as often. Symptoms are more manageable. She has less negative thoughts and irritability. No sense of feeling numb. No thoughts of suicide or self harm. Overall, she is calmer and adapting to her new job well.     Health Maintenance: Last pap was in . No history of abnormal pap smears.  Mammogram was done in . She does regular self breast exams. She has a colonoscopy planned for next week. She is due for a tetanus shot.     REVIEW OF SYSTEMS:   Denies any fever, chills, cough, URI's, hematuria, hemoptysis, hematochezia, dizziness, LOC, chest pain, heart palpitations, SOB, abdominal pain, dysuria, changes in stool caliber, concerning changes to her breast tissue, sudden weight loss, colds, or nose bleeds. All other systems are negative.    PFSH:  She recently started a new job. No changes to medical, surgical, or family history. One sister was diagnosed with colon cancer at 32 and  6 months later. She drinks alcohol 1-2 a week. No tobacco use. She walks about 20,000 steps a day at her new job. She is improving her diet. Maternal history of skin cancer that was removed from her face, breast cancer (diagnosed at 65), and diabetes. Brother and sister with hypertension and hyperlipidemia.     History   Smoking Status     Never Smoker   Smokeless Tobacco     Never Used       Family  "History   Problem Relation Age of Onset     Myelodysplastic syndrome Father      Skin cancer Mother      Diabetes Mother      Breast cancer Mother      Hypertension Sister      Hyperlipidemia Sister      Colon cancer Sister 32      6 months later     Hypertension Brother      Hyperlipidemia Brother        Social History     Social History     Marital status:      Spouse name: N/A     Number of children: N/A     Years of education: N/A     Occupational History     Not on file.     Social History Main Topics     Smoking status: Never Smoker     Smokeless tobacco: Never Used     Alcohol use 1.0 oz/week     2 drink(s) per week     Drug use: No     Sexual activity: Not on file     Other Topics Concern     Not on file     Social History Narrative    . New job in a restaurant. No tobacco use. Drinks 1-3 alcoholic beverages a week.        Past Surgical History:   Procedure Laterality Date     APPENDECTOMY  spring 2011      [place]  1991     [place]      Description:  Section;  Recorded: 2008;     [place] HEMORRHOIDECTOMY INTERNAL RUBBER BAND LIGATIONS      Description: Hemorrhoidectomy;  Recorded: 2008;       No Known Allergies    Active Ambulatory Problems     Diagnosis Date Noted     Urge And Stress Incontinence      Benign Adenomatous Polyp Of The Large Intestine      Hyperlipidemia      Obesity      Chronic Major Depression      Warts      Depression      Anxiety 12/15/2015     IUD (intrauterine device) in place 2017     Resolved Ambulatory Problems     Diagnosis Date Noted     No Resolved Ambulatory Problems     Past Medical History:   Diagnosis Date     Hypercholesteremia        VITALS:  Vitals:    17 1004   BP: 118/70   Patient Site: Left Arm   Patient Position: Sitting   Cuff Size: Adult Regular   Pulse: (!) 59   SpO2: 100%   Weight: 191 lb 14.4 oz (87 kg)   Height: 5' 8.75\" (1.746 m)     Wt Readings from Last 3 Encounters:   17 191 " lb 14.4 oz (87 kg)   02/23/17 193 lb 8 oz (87.8 kg)   06/30/16 199 lb 4.8 oz (90.4 kg)     Body mass index is 28.55 kg/(m^2).    PHYSICAL EXAM:  Vital signs noted above. AAO ×3.    HEENT no nasal discharge, moist oral mucosa.    Neck: Supple neck, nonpalpable cervical lymph nodes.  No thyromegaly.  Lungs: Clear to auscultation bilateral.    Breasts: Dense tissue on the left, otherwise normal.  Bleeding, discharge.  No axillary lymphadenopathy.  Heart: S1-S2 regular rate and rhythm, no murmurs were noted.    Abdomen: Flabby, soft with bowel sounds and nontender.    Extremities: No edema, pulses were full and equal.   : Cervix tilted down. Normal external female genitalia.  Negative CMT, no adnexal mass or tenderness.  Psych: Appropriate affect.     QUALITY MEASURES:  The following high BMI interventions were performed this visit: encouragement to exercise and lifestyle education regarding diet    ADDITIONAL HISTORY SUMMARIZED (2): None.  DECISION TO OBTAIN EXTRA INFORMATION (1): None.   RADIOLOGY TESTS (1): None.  LABS (1): Labs ordered.   MEDICINE TESTS (1): None.  INDEPENDENT REVIEW (2 each): None.     The visit lasted a total of 20 minutes face to face with the patient. Over 50% of the time was spent counseling and educating the patient about vaginal bleeding.    Yris SPANN, am scribing for and in the presence of, Dr. Black.    Dr. Wayne SPANN, personally performed the services described in this documentation, as scribed by Yris Garcia in my presence, and it is both accurate and complete.    MEDICATIONS:  Current Outpatient Prescriptions   Medication Sig Dispense Refill     cholecalciferol, vitamin D3, 1,000 unit tablet Take 1,000 Units by mouth daily.       citalopram (CELEXA) 10 MG tablet Take 1.5 tablets (15 mg total) by mouth daily. 135 tablet 0     LEVONORGESTREL (MIRENA UTRN) by Intrauterine route.       multivitamin capsule Take 1 capsule by mouth daily.       No current facility-administered medications  for this visit.        Total data points: 1

## 2021-06-10 NOTE — TELEPHONE ENCOUNTER
Left message to call back for: Kaley  Information to relay to patient:    Please schedule for colposcopy summer/fall 2020 after pandemic.     Luz Ryan LPN

## 2021-06-14 NOTE — PROGRESS NOTES
Assessment/Plan:        Diagnoses and all orders for this visit:    Depression with anxiety    Visit for screening mammogram  -     Mammo Screening Bilateral; Future; Expected date: 12/5/17    Other orders  -     venlafaxine (EFFEXOR XR) 37.5 MG 24 hr capsule; Take 1 capsule (37.5 mg total) by mouth daily.  Dispense: 30 capsule; Refill: 0        We will hold off on the citalopram completely.  Advised to avoid taking medications randomly or as needed.  Discussed potential withdrawal side effects.  Was started on venlafaxine 37.5 mg daily.  She is to follow-up in a month, earlier if symptoms worse or suicidal ideation in place.  To reconsider psychotherapy.  Continue to connect with family or friends.  She was agreeable with the plans.  Subjective:    Patient ID: Kaley Acevedo is a 52 y.o. female.    ULISES Roche is here with her friend Jaya.  She is on citalopram.  Was supposed to increase it to 15 but she maintained on 10 mg daily.  She has not been consistent with intake.  She would take it depending on how she feels.  If she is overwhelmed, overly stressed or anxious, then she would take it more consistent for around 2 weeks.  She would then skip it for a few days and then take it randomly.  Feels that if she is on it or not it does not help as much for anxiety.  She still feels anxious, depressed when she takes it.  She would have hopelessness.  She does not like it when she takes it, she feels that she is absent, decreased energy or difficulty with multitasking.  It does help decrease the random thoughts.  She has decreased interest, difficulty with maintaining sleep.  She has constant worries and negative thoughts.  Feels that she cannot improve on her anxiety.  She recalls of being on Prozac before for 9 months.  She noted benefit until it started causing her to feel more numb.  She was also on Wellbutrin before but did not feel good and was very emotional.  She tried it for around 2 months.  She was on  Lexapro before with decreased benefit.  She stepped down on her position in August 2017 as she did not want to deal with anxiety and stressors.  She is in a different position.  She has been in the same company for a number of years.  She feels that she just shifted her anxiety despite her position having less responsibilities.  Denies any suicidal ideation.    Last mammogram in 2010.  Will consider having a mammogram at this time.    Review of Systems  As above otherwise negative.          Objective:    Physical Exam  /80 (Patient Site: Left Arm, Patient Position: Sitting, Cuff Size: Adult Regular)  Pulse (!) 57  Wt 198 lb 6.4 oz (90 kg)  SpO2 99%  Breastfeeding? No  BMI 29.51 kg/m2    Vital signs noted above. AAO ×3.  Appears well and appropriate for age.  Mood and insight appropriate.  No flight of ideas.

## 2021-06-15 NOTE — TELEPHONE ENCOUNTER
"Colleen calling from [facility], transferred patient to speak with FNA.  Patient reporting she has been dizzy x 1 week. Patient reporting she is so dizzy she has been having difficulty ambulating this morning. Vomiting x 4 episodes this morning. Right side decreased hearing \"feeling fuzzy.\"    Patient was seen in [facility] this morning and advised to go to ER for possible stroke symptoms.     Advised patient to follow recommendation of provider and go to ED.    Patient agrees to have someone drive her to [facility] ED now.    COVID 19 Nurse Triage Plan/Patient Instructions    Please be aware that novel coronavirus (COVID-19) may be circulating in the community. If you develop symptoms such as fever, cough, or SOB or if you have concerns about the presence of another infection including coronavirus (COVID-19), please contact your health care provider or visit [email or web address].     Disposition/Instructions    ED Visit recommended. Follow protocol based instructions.      Bring Your Own Device:  Please also bring your smart device(s) (smart phones, tablets, laptops) and their charging cables for your personal use and to communicate with your care team during your visit.      Thank you for taking steps to prevent the spread of this virus.  o Limit your contact with others.  o Wear a simple mask to cover your cough.  o Wash your hands well and often.    Resources    [facility]: About COVID-19: [email or web address]    CDC: What to Do If You're Sick: www.cdc.gov/coronavirus/2019-ncov/about/steps-when-sick.html    CDC: Ending Home Isolation: www.cdc.gov/coronavirus/2019-ncov/hcp/disposition-in-home-patients.html     CDC: Caring for Someone: www.cdc.gov/coronavirus/2019-ncov/if-you-are-sick/care-for-someone.html     [facility]: Interim Guidance for Hospital Discharge to Home: www.health.[facility].[place]/diseases/coronavirus/hcp/hospdischarge.pdf    [facility] clinical trials (COVID-19 research " studies): [email or web address]-clinical-trials     Below are the COVID-19 hotlines at the [place] Department of Health ([facility]). Interpreters are available.   o For health questions: Call 372-049-0611 or 1-842.340.3790 (7 a.m. to 7 p.m.)  o For questions about schools and childcare: Call 970-822-6373 or 1-221.585.4551 (7 a.m. to 7 p.m.)    Reason for Disposition    Nursing judgment or information in reference    Additional Information    Negative: Nursing judgment, per information in Reference    Negative: Information only call about a Well Adult (no illness or injury)    Protocols used: NO GUIDELINE [id number]

## 2021-06-15 NOTE — TELEPHONE ENCOUNTER
Refill Approved    Rx renewed per Medication Renewal Policy. Medication was last renewed on 3/10/20.    Cale Hargrove, [facility] Triage/Med Refill 3/4/2021     Requested Prescriptions   Pending Prescriptions Disp Refills     citalopram (CELEXA) 20 MG tablet [Pharmacy Med Name: CITALOPRAM HBR 20 MG TABLET] 30 tablet 8     Sig: TAKE 1 TABLET BY MOUTH, WITH THE 10MG TABLET FOR TOTAL OF 30MG DAILY.       SSRI Refill Protocol  Passed - 3/4/2021  7:25 AM        Passed - PCP or prescribing provider visit in last year     Last office visit with prescriber/PCP: 3/26/2020 Leatha Bernal MD OR same dept: 3/26/2020 Leatha Bernal MD OR same specialty: 3/26/2020 Leatha Bernal MD  Last physical: 3/10/2020 Last MTM visit: Visit date not found   Next visit within 3 mo: Visit date not found  Next physical within 3 mo: Visit date not found  Prescriber OR PCP: Leatha Bernal MD  Last diagnosis associated with med order: 1. Moderate episode of recurrent major depressive disorder (H)  - citalopram (CELEXA) 20 MG tablet [Pharmacy Med Name: CITALOPRAM HBR 20 MG TABLET]; TAKE 1 TABLET BY MOUTH, WITH THE 10MG TABLET FOR TOTAL OF 30MG DAILY.  Dispense: 30 tablet; Refill: 8    2. Anxiety  - citalopram (CELEXA) 20 MG tablet [Pharmacy Med Name: CITALOPRAM HBR 20 MG TABLET]; TAKE 1 TABLET BY MOUTH, WITH THE 10MG TABLET FOR TOTAL OF 30MG DAILY.  Dispense: 30 tablet; Refill: 8    If protocol passes may refill for 12 months if within 3 months of last provider visit (or a total of 15 months).

## 2021-06-16 PROBLEM — Z80.0 FAMILY HISTORY OF COLON CANCER: Status: ACTIVE | Noted: 2017-03-30

## 2021-06-16 PROBLEM — K63.5 POLYP OF COLON: Status: ACTIVE | Noted: 2017-03-30

## 2021-06-16 NOTE — PROGRESS NOTES
"Assessment/plan   Kaley Acevedo is a 55 y.o. female who is established to my practice.    Kaley was seen today for follow-up.    Diagnoses and all orders for this visit:    Moderate episode of recurrent major depressive disorder (H)  Anxiety  PHQ-9 Total Score: 10 (4/13/2021  3:00 PM)  . NEENA-7 Total: 10 (4/13/2021  3:00 PM)  Long conversation today regarding routes for managing her collection of postmenopausal symptoms in setting of primarily overwhelm/fatigue/depression type symptoms. She prefers to avoid hormone replacement therapy right now and would like to work with counseling and medication changes.   - Start Wellbutrin 150mg daily; side effect profile reviewed. She had tried this in the past at a time in life when so much emotion was on board (family death) that she isn't sure she was able to really see if it helped; she feels at a better place right now and open to trying this again particularly due to the possible weight benefit  - Continue Celexa 20mg daily  - Reviewed exercise, healthy eating and mindfulness concepts  - Referral for places to do counseling provided  - Previous trials: higher dose Celexa 30mg (brain fog), venlafaxine, Prozac    Post menopausal  IUD removed 3/2020 and no cycles in the past year. Based on labs from 2018 (LH 30.8, FSH 73.90, was at that time likely pt is postmenopausal. She does have menopausal type symptoms with hot flashes, weight gain, difficulty with losing weight, mood swings.  No vaginal dryness.   Option for HRT reviewed today and pt declines at this time with above efforts at focusing on life/mood first    Defers labs today to r/o thyroid/anemia/etc which have \"always been normal\"    Got the J&J COVID vaccine.      ASCUS with positive high risk HPV cervical  Colposcopy will be scheduled and repeat pap; this was deferred last year due to COVID precautions at that time required by our health organization for ASCUS/LSIL paps. ( Pap results from 3/10/2020 which are " "ASCUS with HPV 16+ and other high risk HPV positive.)      Follw up: 1 month; also for colposcopy     Leatha Bernal MD    Subjective:      HPI: Kaley Acevedo is a 55 y.o. female who is here for:    Chief Complaint   Patient presents with     Follow-up     discuss hormones and IUD removal, did not schedule colp in the fall due to COVID.       Discuss mood swings, depression sx:      She had her IUD removed 3/26/2020.  No periods since it was removed. Confirming she is postmenopausal.     More mood swings. She was only doing 20mg Celexa lately, the 30mg dose was \"too out of it\"  Lack of focus, moodiness, sadness, lack of energy. Overall main concern is her low mood, lack of energy/motivation to do things and missing the domingo in life.   Weight gain, hard to loose the weight  Hot flashes are pretty minimal currently- occurring every other day.  No significant vaginal dryness    Things got worse over the winter but still not feeling better with spring on the horizon    Her fiance notices the sadness and \"bad days\"    Her sister is on hormone replacement therapy    Not currently seeing a counselor.  Has been twice in the past years ago.      Review of Systems:  12 point comprehensive review of systems was negative except as noted and HPI     Social History:  Social History     Social History Narrative     but currently in another relationship. Works at [employer]. 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 No tobacco use. Drinks 1-3 alcoholic beverages a week.     Leatha Bernal MD       Medical History:  Patient Active Problem List   Diagnosis     Urge And Stress Incontinence     Hyperlipidemia     Obesity     Chronic Major Depression     Anxiety     Family history of colon cancer     Polyp of colon     ASCUS with positive high risk HPV cervical     Past Medical History:   Diagnosis Date     Anxiety      Benign Adenomatous Polyp Of The Large Intestine     Created by " [facility]: Mar 26 2007  1:03PM - Vivian Alvarado: colonscopy Q 5  years      Depression      Hypercholesteremia      Past Surgical History:   Procedure Laterality Date     APPENDECTOMY  spring 2011     BREAST BIOPSY Left 2010    BENIGN      [place]  1991     [place]      Description:  Section;  Recorded: 2008;     [place] HEMORRHOIDECTOMY INTERNAL RUBBER BAND LIGATIONS      Description: Hemorrhoidectomy;  Recorded: 2008;     Patient has no known allergies.  Family History   Problem Relation Age of Onset     Myelodysplastic syndrome Father      Skin cancer Mother      Diabetes Mother      Breast cancer Mother      Hypertension Sister      Hyperlipidemia Sister      Colon cancer Sister 32         6 months later     Hypertension Brother      Hyperlipidemia Brother        Medications:  Current Outpatient Medications   Medication Sig Dispense Refill     cholecalciferol, vitamin D3, 1,000 unit tablet Take 1,000 Units by mouth daily.       citalopram (CELEXA) 20 MG tablet TAKE 1 TABLET BY MOUTH, WITH THE 10MG TABLET FOR TOTAL OF 30MG DAILY. 90 tablet 0     multivitamin capsule Take 1 capsule by mouth daily.       buPROPion (WELLBUTRIN XL) 150 MG 24 hr tablet Take 1 tablet (150 mg total) by mouth every morning. 30 tablet 2     citalopram (CELEXA) 10 MG tablet TAKE 1 TABLET BY MOUTH ONCE DAILY, WITH THE 20MG DOSE FOR A TOTAL OF 30MG DAILY. 90 tablet 2     diazePAM (VALIUM) 5 MG tablet Take 1 tablet (5 mg total) by mouth every 8 (eight) hours as needed for other (dizziness, vertigo). 6 tablet 0     hydrOXYzine HCl (ATARAX) 25 MG tablet Take 1-2 tablets (25-50 mg total) by mouth 3 (three) times a day as needed for anxiety. 60 tablet 4     meclizine (ANTIVERT) 25 mg tablet Take 1 tablet (25 mg total) by mouth 3 (three) times a day as needed for dizziness or nausea. 21 tablet 0     No current facility-administered medications for this visit.        Imaging &  Labs reviewed this visit:   Pap reviewed as per A/P      Objective:      Vitals:    21 1450   BP: 126/72   Pulse: 72       Physical Exam:     General: Alert, no acute distress.   HEENT: normocephalic conjunctivae are clear. EOMI  Neck: supple   Lungs: Normal effort  Heart: regular rate  Abdomen: soft   Skin: clear without rash or lesions  Neuro: alert, oriented  Psych: mood down, affect appropriate, good eye contact, insight and judgment intact, normal speech pattern. Denies SI/HI    PHQ9 score PHQ-9 Total Score: 10 (2021  3:00 PM)    Little interest or pleasure in doing things: More than half the days  Feeling down, depressed, or hopeless: Several days  Trouble falling or staying asleep, or sleeping too much: Several days  Feeling tired or having little energy: More than half the days  Poor appetite or overeating: Several days  Feeling bad about yourself - or that you are a failure or have let yourself or your family down: More than half the days  Trouble concentrating on things, such as reading the newspaper or watching television: Several days  Moving or speaking so slowly that other people could have noticed. Or the opposite - being so fidgety or restless that you have been moving around a lot more than usual: Not at all  Thoughts that you would be better off dead, or of hurting yourself in some way: Not at all  PHQ-9 Total Score: 10  If you checked off any problems, how difficult have these problems made it for you to do your work, take care of things at home, or get along with other people?: Very difficult    GAD7 score NEENA-7 Total: 10 (2021  3:00 PM)    Feeling nervous, anxious or on edge: 1 (2021  3:00 PM)  Not being able to stop or control worry: 2 (2021  3:00 PM)  Worrying too much about different things: 2 (2021  3:00 PM)  Trouble relaxin (2021  3:00 PM)  Being so restless that is is hard to sit still: 1 (2021  3:00 PM)  Becoming easily annnoyed or irritable: 2  (4/13/2021  3:00 PM)  Feeling afraid as if something awful might happen: 1 (4/13/2021  3:00 PM)  NEENA-7 Total: 10 (4/13/2021  3:00 PM)  How difficult did these problems make it for you to do your work, take care of things at home or get along with other people? : Somewhat difficult (4/13/2021  3:00 PM)  How difficult did these problems make it for you to do your work, take care of things at home or get along with other people? : Somewhat difficult (4/13/2021  3:00 PM)          Leatha Bernal MD

## 2021-06-16 NOTE — PROGRESS NOTES
Assessment/Plan:        Diagnoses and all orders for this visit:    Major depressive disorder, recurrent episode  -     venlafaxine (EFFEXOR XR) 37.5 MG 24 hr capsule; Take 1 capsule (37.5 mg total) by mouth daily.  Dispense: 90 capsule; Refill: 0        Pain with the venlafaxine but more consistency with intake.  Discussed concerns with irregular intake, possible withdrawal side effects.  She will try to consider doing this although may not be able to promise to take it regularly.  Encouraged to follow-up in 3 months, earlier if symptoms worse.  Continue to share her emotions to her boyfriend and she reach out to him.  She was agreeable with the plans.    25 spent with more than 50% in counseling and discussion of treatment plans.  Subjective:    Patient ID: Kaley Acevedo is a 52 y.o. female.    ULISES Roche is here for follow-up regarding her medication.  She was switched to venlafaxine on her last visit.  She notes improvement in her anxiety.  She feels that it is more controlled or calm.  She has a lot of stressors ongoing at this time and has difficulty sharing them.  She also does not like coming here.  She has difficulty keeping medications consistent and more of personal choice.  She does not forget to take them but sometimes would just not take them.  She usually takes it every other day or every 3 days.  She notes that her appetite is increased, decreased interest with her relationship.  She feels stress with work and other issues.  She had a divorce recently and had financial repercussions after that.  Still dealing with it.  Occasional hopelessness.  Loss of interest or motivation.  She tries to continue to work despite her symptoms and is able to perform well.  She has negative thoughts, worries but this is her nature and has been the same as before.  She reaches out to her boyfriend who has been supportive.  She has problems sharing her emotions to anyone else and has not established with a  therapist as previously discussed.  NEENA 7 score of 10, PHQ 9 score of 10.    Review of Systems  As above otherwise negative.          Objective:    Physical Exam  /80 (Patient Site: Left Arm, Patient Position: Sitting, Cuff Size: Adult Regular)  Pulse 63  SpO2 99%  Breastfeeding? No    Vital signs noted above. AAO ×3.  No flight of ideas.  Good eye contact.  Appears well and appropriate for age.  Mood and insight appropriate.

## 2021-06-18 NOTE — PATIENT INSTRUCTIONS - HE
Patient Instructions by Leatha Bernal MD at 4/13/2021  2:40 PM     Author: Leatha Bernal MD Service: -- Author Type: Physician    Filed: 4/13/2021  3:18 PM Encounter Date: 4/13/2021 Status: Addendum    : Leatha Bernal MD (Physician)    Related Notes: Original Note by Leatha Bernal MD (Physician) filed at 4/13/2021  3:09 PM         Getting Ready for the Colposcopy Procedure   Colposcopy is normally done in your health care providers office. It will be scheduled for a time when youre not having your menstrual period. You may be asked to sign a form giving your consent to have the procedure. A day or two before the procedure, your health care provider may also ask you to:     Avoid sexual intercourse     Stop using tampons.     Avoid using creams or other vaginal medications.     Avoid douching.     Take 600mg Ibuprofen an hour or two before the procedure.    Colposcopy is a procedure that gives your health care provider a magnified view of the cervix. It is done using a lighted microscope called a colposcope. In most cases, a sample of cervical cells is taken during a biopsy. The sample can then be studied in a lab. If any problems are found, you and your health care provider will discuss treatment options. It usually takes less thanÂ 30 minutes, and you can often go back to your normal routine right away.     Reasons for the Procedure   Colposcopy is usually done as a follow-up exam to help find the cause of an abnormal Pap test. Abnormal Pap tests are often due to an HPV (human papilloma virus) infection. HPV is a large family of viruses. HPV can be passed from person to person through sex. HPV can cause genital warts. It can also cause changes in cervical cells. Colposcopy is also used to assess other problems. These include pain or bleeding during sexual intercourse, or a lesion on the vulva or vagina.         What Are the Risks?   Problems after colposcopy are very rare, but  can include:     Bleeding (if a biopsy is done)     Infection          During Colposcopy     You will be asked to lie on an exam table with your knees bent, just as you do for a Pap test.     An instrument called a speculum is inserted into the vagina to hold it open.     A vinegar solution is applied to the cervix to make the abnormal cells easier to see. You may feel pressure or a slight burning for a few moments. In some cases, the cervix may be numbed first with an anesthetic.     The cervix is viewed through the colposcope, which is placed outside the vagina.     If your health care provider sees abnormal areas on the cervix, a biopsy will be done. The tissue sample is sent to a lab for study.     An endocervical curettage may also be done at the time of colposcopy. In this procedure, an instrument is put into the endocervical canal to get a sample of cells from the endocervix. This area can't be seen with a colposcope.    You may feel slight pinching or cramping during the biopsy. Medication may be applied to the biopsy site to stop bleeding.    After the Procedure     If you feel lightheaded or dizzy, you can rest on the table until youre ready to get dressed.     If a biopsy was done, you may have mild cramping or light bleeding for a few days. You may also have discharge from the medication used to stop bleeding at the biopsy site.     Use pads, not tampons, for at least the first 24 hours.     If you have any discomfort, over-the-counter pain medication can provide relief.     Ask your health care provider when you can resume sexual intercourse.    Follow-Up   If a biopsy was done, your health care provider will get the lab report in a week or 2. You and your health care provider can then discuss the results. In some cases, you may be scheduled for further tests or treatment. Be sure to keep follow-up appointments with your health care provider.       Call your health care provider if you have:     Heavy  "vaginal bleeding (more than a pad an hour for 2 hours).     Severe or increasing pelvic pain.     A fever over 100.4F (38 C)     Foul-smelling or unusual vaginal discharge.          COUNSELING RECOMMENDATIONS    Individual names:  Julissa Robert @ [facility]  Lisa Verdugo- private clinic, good with kids too  [facility] support [facility]    [facility]  Colleen Zuniga  Eva Robert  [address], [address]   [place] [address] [phone]    [facility]  Ellie Mills MA, [facility]  Psychotherapist, somatic experiencing therapist  [address]  [facility] [address]  [phone]  [email or web address]  [email or web address]      Consider looking into Podcasts on intentional living: \"With Intention\", \"Next Right Thing\" for example    MINDFULNESS    \"Mindfulness is about being fully awake in our lives. It is about perceiving the exquisite vividness of each moment.\"      - Russel Schwartz  Mindfulness-Based Stress Reduction (MBSR) is a blend of meditation, body awareness, and yoga:   learning through practice and study how your body handles (and can resolve) stress neurologically.     Mindfulness Resources (all are free):  1. \"Calm\" or Headspace, AT[employer] keith- free trial has guided 10min sessions on anxiety, gratitude, sleep, happiness, managing stress, etc.   2. \"Smiling Minds\" keith- short guided sessions for children and adults  3. \"Insight Timer\" keith- free meditation timer with musical or bell tones, can also stream guided meditations    4. Free 8 week MBSR program online: https://"[employer]/      Weight Loss Strategies for Success: (start small, pick 1 or 2 goals each week)    1. Have a  \"Table, chair, plate\" with every meal. Sit down to eat your food!  2. Brush your teeth after the last meal of the day. Prevents evening snacking.   3. Avoid sugary beverages. (pop, fancy coffees). Reduce alcohol.   4. Drink water instead.   5. No " "fast food (or reduce meals out to a specific #/week). Eat at home 90% of the time.  6. Keep a food log- apps like [employer] are very helpful for this.  7. Start the morning with breakfast. Every single day.   8. High protein (60g/day): Try to get protein in at least every 3.5 hours during the day to avoid a hunger surge late in the day.  9. If hungry, start with a protein serving, followed by water and then a crunchy vegetable that requires chewing (this decreases the hunger hormone).   10. Use plenty of healthy fats (olive oil, avocados, nuts) when cooking which will make you feel more satisfied.    11. Be intentional and think about what you are eating and feel the food fuel you.  12. Choose one day to be \"meal planning\" and/or \"meal prep\" days. Plan ahead for grocery shopping for healthy food choices, and spend 30min-1hr each week prepping your fruits/veggies (wash, chop, store in easy grab portions).  13. Make it easy to grab the protein (cut up chicken breasts, greek yogurt containers, hard boiled eggs, etc)  14. Start low intensity exercise 30 minutes/day (walking/hiking/yard work).  15. Goal for 10,000 steps per day (consider a FitBit or other tracking device).  16. Don't step on the scale, but if you must: Measure inches, not weight!                 "

## 2021-06-19 NOTE — PROGRESS NOTES
ASSESSMENT/PLAN:    Anxiety, Major depressive disorder, recurrent episode (H)  52-year-old female with major depression and generalized anxiety disorder presented today for medication change.  I will wean her off Effexor and restart her on citalopram.  She is to follow-up in 1 month.  Her primary care provider is no longer here at this clinic and she will establish care and follow-up with his new primary care provider in 1 month.  If she is unable to get into her new primary care provider that she can also schedule an appointment to see me as well.  Reviewed medication side effects.  We contracted for safety.  Motivational interviewing was utilized today.  I discussed psychotherapy but she declined at this time.  -     citalopram (CELEXA) 10 MG tablet; Take 1 tablet (10 mg total) by mouth daily.  -     venlafaxine (EFFEXOR XR) 37.5 MG 24 hr capsule; Take 1 capsule every other day    SUBJECTIVE:    Kaley Acevedo is a 52 y.o. female who came in today for medication check.  The patient has long-standing major depression and generalized anxiety disorder.  She was on a citalopram but that was switched over to citalopram at 10 mg.  She did not think that citalopram was working but she also stated that she was not using this medication consistently.  Citalopram was then switch over to venlafaxine 37.5 mg.  She has been using this for the last 3 months and does not think that this is been helpful to her.  She still feeling somewhat more anxious and depressed on this medication.  No suicidal homicidal ideation.  PHQ9=10, GAD7=10  The patient is not in psychotherapy.  She states that she has a hard time talking to people.  She does have a supportive network.  She talks to her family members often who have been helpful.    Review of Systems (except those mentioned above)  Constitutional: Negative.   HENT: Negative.   Eyes: Negative.   Respiratory: Negative.   Cardiovascular: Negative.   Gastrointestinal: Negative.    Endocrine: Negative.   Genitourinary: Negative.   Musculoskeletal: Negative.   Skin: Negative.   Allergic/Immunologic: Negative.   Neurological: Negative.   Hematological: Negative.   Psychiatric/Behavioral: Negative.     Patient Active Problem List    Diagnosis Date Noted     IUD (intrauterine device) in place 2017     Anxiety 12/15/2015     Urge And Stress Incontinence      Benign Adenomatous Polyp Of The Large Intestine      Hyperlipidemia      Obesity      Chronic Major Depression      Warts      Depression      No Known Allergies  Current Outpatient Prescriptions   Medication Sig Dispense Refill     cholecalciferol, vitamin D3, 1,000 unit tablet Take 1,000 Units by mouth daily.       LEVONORGESTREL (MIRENA UTRN) by Intrauterine route.       multivitamin capsule Take 1 capsule by mouth daily.       venlafaxine (EFFEXOR XR) 37.5 MG 24 hr capsule Take 1 capsule every other day 30 capsule 0     citalopram (CELEXA) 10 MG tablet Take 1 tablet (10 mg total) by mouth daily. 30 tablet 0     No current facility-administered medications for this visit.      Past Medical History:   Diagnosis Date     Hypercholesteremia      Past Surgical History:   Procedure Laterality Date     APPENDECTOMY  spring 2011      SECTION  1991     ND  DELIVERY ONLY      Description:  Section;  Recorded: 2008;     ND HEMORRHOIDECTOMY INTERNAL RUBBER BAND LIGATIONS      Description: Hemorrhoidectomy;  Recorded: 2008;     Social History     Social History     Marital status:      Spouse name: N/A     Number of children: N/A     Years of education: N/A     Social History Main Topics     Smoking status: Never Smoker     Smokeless tobacco: Never Used     Alcohol use 1.0 oz/week     2 drink(s) per week     Drug use: No     Sexual activity: Not Asked     Other Topics Concern     None     Social History Narrative    . New job in a restaurant. No tobacco use. Drinks 1-3 alcoholic beverages  a week.      Family History   Problem Relation Age of Onset     Myelodysplastic syndrome Father      Skin cancer Mother      Diabetes Mother      Breast cancer Mother      Hypertension Sister      Hyperlipidemia Sister      Colon cancer Sister 32      6 months later     Hypertension Brother      Hyperlipidemia Brother          OBJECTIVE:    Vitals:    18 1350   BP: 110/62   Pulse: 68     There is no height or weight on file to calculate BMI.    Physical Exam:  Constitutional: Patient was oriented to person, place, and time. Patient appeared well-developed and well-nourished. No distress.   The patient has good eye contact.  No psychomotor retardation or stereotypical behaviors.  Speech was regular rate, regular rhythm, adequate responses.  Mood was stable and affect was congruent mood.  No suicidal or homicidal intent.  No hallucination.

## 2021-06-19 NOTE — LETTER
Letter by Sharron Coronado at      Author: Sharron Coronado Service: -- Author Type: --    Filed:  Encounter Date: 4/11/2019 Status: (Other)         Kaley H [place]  [address] [facility] [address] [facility] [address]           04/11/19       Dear Kaley:      At [facility], we care about your health and well-being.  Your primary care provider is committed to ensuring you receive high quality care and has chosen a network of specialists to assist in providing that care.  Recently Dr. Bernal referred you to [facility] for a screening mammogram.     It is important to your overall health to follow through with the referral from your care provider.  Please call [facility] Scheduling [phone] at your earliest convenience for assistance in scheduling an appointment.  If you have already scheduled an appointment, please disregard this notice.  Thank you for choosing the [facility] for your healthcare needs.        Sincerely,        Electronically signed by Sharron Coronado      Referral Coordinator   [facility]

## 2021-06-20 ENCOUNTER — HEALTH MAINTENANCE LETTER (OUTPATIENT)
Age: 56
End: 2021-06-20

## 2021-06-22 NOTE — PROGRESS NOTES
"Assessment/plan   Kaley Acevedo is a 53 y.o. female who is established to my practice.    Kaley was seen today for anxiety.    Diagnoses and all orders for this visit:    Anxiety  Moderate episode of recurrent major depressive disorder (H)  Exacerbated in setting of perimenopause transition  Some improvement with her anxiety but not depression at the 20mg Celexa dose. Recommended trial increase to 30mg daily (as 10 and 20mg tabs) for the next month or so. We did discuss use of Wellbutrin to augment mood therapy and lessen potential effects of low libido. However, given her preference today for dose escalation with citalopram, I recommended we hold off on starting the new wellbutrin medication until f/u visit. Also recommended vistaril for prn anxiety relief which can also potentially help with sleep. Recommended to start with 1 tablet at night, can increased per directions to three times per day as needed. Encouraged exercise. Normalized her perimenopausal sx and helped to set expectations for time course and anticipated symptoms.   -     citalopram (CELEXA) 10 MG tablet; Take 10mg with the 20mg dose for total of 30mg daily.  -     hydrOXYzine HCl (ATARAX) 25 MG tablet; Take 1-2 tablets (25-50 mg total) by mouth 3 (three) times a day as needed for anxiety  -     citalopram (CELEXA) 10 MG tablet; Take 10mg with the 20mg dose for total of 30mg daily.     Visit for screening mammogram  -     Mammo Screening Bilateral; Future    ASCUS with positive high risk HPV cervical  Chart reviewing after pt left: 3/2017 pap ASCUS with HPV+  4/17/17- colposcopy with [facility] *see media tab; showed normal visually and bx reported \"cells with HPV effect, very mild, nondiagnostic for dysplasia\".  Will recommend to pt to have pap/cotesting repeated as this should have been done 1 year after colp.       Follow up: 1-2 months    Leatha Bernal MD    Subjective:      HPI: Kaley Acevedo is a 53 y.o. female who is here for:    Chief " Complaint   Patient presents with     Anxiety     follow up anxiety       F/u anxiety/depression:  Currently on 20mg celexa, increased from 10mg on 11/29. She feels the anxiety is a notably better, but still does struggle with motivation quite a bit. The low mood affects many aspects of her life- including self body image, libido (not made worse by the medication, just low in general), and her relationships with others. Doesn't enjoy going out to be with friends still. Significant fatigue, joint pains, hot flashes- again not new from our last conversation but she highlights these perimenopausal sx are contributing to low mood. Poor concentration.     Had one therapy appt, and next is Jan 10th. Understands the importance of this and will go back. Found it potentially helpful to learn new coping skills.   Goes to pilates once per week. Hoping to increase this soon.     PHQ9 of 12 (from 11 last time)  GAD7 of 6 (from 13)      Review of Systems:  Still worried about her weight but right now knows she can't even find motivation to focus on this. We will focus on her mood stabilization first, followed by weight loss management.   Desires to keep her IUD in for a few more years (reviewed again her hormone lab results from last visit).    12 point comprehensive review of systems was negative except as noted and HPI     Social History:  Social History     Social History Narrative     but currently in another relationship. Works at [employer]. 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 No tobacco use. Drinks 1-3 alcoholic beverages a week.     Leatha Bernal MD       Medical History:  Patient Active Problem List   Diagnosis     Urge And Stress Incontinence     Hyperlipidemia     Obesity     Chronic Major Depression     Anxiety     IUD (intrauterine device) in place     Family history of colon cancer     Polyp of colon     ASCUS with positive high risk HPV cervical     Past  Medical History:   Diagnosis Date     Anxiety      Benign Adenomatous Polyp Of The Large Intestine     Created by [facility] Annotation: Mar 26 2007  1:03PM - Vivian Alvarado: colonscopy Q 5  years      Depression      Hypercholesteremia      Past Surgical History:   Procedure Laterality Date     APPENDECTOMY  spring 2011      SECTION  1991     SD  DELIVERY ONLY      Description:  Section;  Recorded: 2008;     SD HEMORRHOIDECTOMY INTERNAL RUBBER BAND LIGATIONS      Description: Hemorrhoidectomy;  Recorded: 2008;     Patient has no known allergies.  Family History   Problem Relation Age of Onset     Myelodysplastic syndrome Father      Skin cancer Mother      Diabetes Mother      Breast cancer Mother      Hypertension Sister      Hyperlipidemia Sister      Colon cancer Sister 32         6 months later     Hypertension Brother      Hyperlipidemia Brother        Medications:  Current Outpatient Medications   Medication Sig Dispense Refill     cholecalciferol, vitamin D3, 1,000 unit tablet Take 1,000 Units by mouth daily.       citalopram (CELEXA) 10 MG tablet Take 10mg with the 20mg dose for total of 30mg daily. 30 tablet 6     LEVONORGESTREL (MIRENA UTRN) by Intrauterine route.       multivitamin capsule Take 1 capsule by mouth daily.       citalopram (CELEXA) 20 MG tablet Take 20mg with the 10mg tablet for total of 30mg daily. 30 tablet 6     hydrOXYzine HCl (ATARAX) 25 MG tablet Take 1-2 tablets (25-50 mg total) by mouth 3 (three) times a day as needed for anxiety. 60 tablet 4     No current facility-administered medications for this visit.        Imaging & Labs reviewed this visit: LH, FSH and TSH labs per last visit      Objective:      Vitals:    18 1450   BP: 118/74   Pulse: 60       Physical Exam:     General: Alert, no acute distress.   HEENT: normocephalic conjunctivae are clear. EOMI  Neck: supple   Lungs: Normal effort  Heart: regular  rate  Abdomen: soft   Skin: clear without rash or lesions  Neuro: alert, oriented  Psych: mood low, affect appropriate, good eye contact, insight and judgment intact, normal speech pattern. No SI/HI    PHQ9 score PHQ-9 Total Score: 12 (2018  3:00 PM)    Little interest or pleasure in doing things: More than half the days  Feeling down, depressed, or hopeless: More than half the days  Trouble falling or staying asleep, or sleeping too much: Several days  Feeling tired or having little energy: More than half the days  Poor appetite or overeating: More than half the days  Feeling bad about yourself - or that you are a failure or have let yourself or your family down: Several days  Trouble concentrating on things, such as reading the newspaper or watching television: More than half the days  Moving or speaking so slowly that other people could have noticed. Or the opposite - being so fidgety or restless that you have been moving around a lot more than usual: Not at all  Thoughts that you would be better off dead, or of hurting yourself in some way: Not at all  PHQ-9 Total Score: 12  If you checked off any problems, how difficult have these problems made it for you to do your work, take care of things at home, or get along with other people?: Very difficult    GAD7 score NEENA-7 Total: 10 (3/15/2018 12:00 PM)  NEENA 7 Total Score: 6 (2018  3:00 PM)    How difficult did these problems make it for you to do your work, take care of things at home or get along with other people? : Somewhat difficult (2018  3:00 PM)  Feeling nervous, anxious, or on edge: 1 (2018  3:00 PM)  Not being able to stop or control worryin (2018  3:00 PM)  Worrying too much about different things: 1 (2018  3:00 PM)  Trouble relaxin (2018  3:00 PM)  Being so restless that it's hard to sit still: 0 (2018  3:00 PM)  Becoming easily annoyed or irritable: 1 (2018  3:00 PM)  Feeling afraid as if  something awful might happen: 1 (12/20/2018  3:00 PM)  NEENA 7 Total Score: 6 (12/20/2018  3:00 PM)  How difficult did these problems make it for you to do your work, take care of things at home or get along with other people? : Somewhat difficult (12/20/2018  3:00 PM)            Leatha Bernal MD

## 2021-06-22 NOTE — PROGRESS NOTES
Assessment/plan   Kaley Acevedo is a 53 y.o. female who is new to my practice.    Kaley was seen today for establish care and medication refill.    Diagnoses and all orders for this visit:    Anxiety and  Moderate episode of recurrent major depressive disorder (H)  No SI/HI. No significant change on 10mg celexa; discussed increase to 20mg and up to 30mg after 1 week if still no significant change. Will reassess in 4 weeks. Pt also interested in therapist. Discussed exercise and mindfulness; provided resources. Will recheck thyroid level to ensure not contributing to her anxiety sx.   -     citalopram (CELEXA) 10 MG tablet; Take 2 tablets (20 mg total) by mouth daily.  -     Ambulatory referral to Psychology  -     Thyroid Stimulating Hormone (TSH); Future    Perimenopause. Discussed perimenopause & relation to worsening mood sx today. Reviewed behavioral management for hot flashes. She is interested in knowing her labs as she has h/o IUD and no periods on regular basis. Could consider gabapentin for nighttime hot flashes. Will see if increased dose of citalopram helps as well.   -     Luteinizing Hormone (LH); Future  -     Follicle Stimulating Hormone (FSH); Future    Neck pain. Symptom briefly evaluated today. She does appear to have limited rotation of her neck to the right, and very tense levator muscles bilaterally. Recommendations made for massage therapy vs physical therapy and also continuing with pilates to increase flexibility. Can consider further imaging and w/u if sx not improving in 4-8 weeks. She declines formal PT referral.     ASCUS with positive high risk HPV cervical  After pt left, was reviewing her result hx further and noticed abnormal pap from 3/23/2017. [facility] Will inquire with pt at f/u visit in 4 weeks.      I spent 40 minutes with the patient, >50% of which was in counseling regarding the patient's medical issues as noted above.    Follow up: 4  weeks- discuss pap and inquire if colp was performed    Leatha Bernal MD    Subjective:      HPI: Kaley Acevedo is a 53 y.o. female who is here for:    Chief Complaint   Patient presents with     [facility]     Medication Refill       Anxiety/depression: Was previously on Effexor 3 months but did not find it helpful, weaned off this and switched to Celexa 10mg as of August 2018. Feels no change on this dose. Reports significant tearfulness, feeling overwhelmed, easily irritable.  Denies SI/HI.     Hormone changes: hot flashes  Started this summer. Feel unsettled, mood swings. Trouble with sleep- falls asleep well, but will wake after 3-4hrs.  Feels like she can't manage the stress/anxiety.   IUD in place due to heavy and frequent bleeding. NO longer having periods since about 8/2018. Occasional spotting. Tends to still have monthly PMS (headaches, moodiness).      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   Recently started [employer]. Dance classes with her boyfriend.     Review of Systems:  Neck pain x 8 months:  can't move her neck past certain way on the side to her right side. Difficulty taking her shirt off. Referred shoulder pain.   IUD in place.   12 point comprehensive review of systems was negative except as noted and HPI     Social History:  Social History     Social History Narrative     but currently in another relationship. 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 No tobacco use. Drinks 1-3 alcoholic beverages a week.     Leatha Bernal MD       Medical History:  Patient Active Problem List   Diagnosis     Urge And Stress Incontinence     Hyperlipidemia     Obesity     Chronic Major Depression     Anxiety     IUD (intrauterine device) in place     Family history of colon cancer     Polyp of colon     ASCUS with positive high risk HPV cervical     Past Medical History:   Diagnosis Date     Anxiety   "    Benign Adenomatous Polyp Of The Large Intestine     Created by [facility] Annotation: Mar 26 2007  1:03PM - Jono Alvaradomy: colonscopy Q 5  years      Depression      Hypercholesteremia      Past Surgical History:   Procedure Laterality Date     APPENDECTOMY  spring 2011      SECTION  1991     CO  DELIVERY ONLY      Description:  Section;  Recorded: 2008;     CO HEMORRHOIDECTOMY INTERNAL RUBBER BAND LIGATIONS      Description: Hemorrhoidectomy;  Recorded: 2008;     Patient has no known allergies.  Family History   Problem Relation Age of Onset     Myelodysplastic syndrome Father      Skin cancer Mother      Diabetes Mother      Breast cancer Mother      Hypertension Sister      Hyperlipidemia Sister      Colon cancer Sister 32         6 months later     Hypertension Brother      Hyperlipidemia Brother        Medications:  Current Outpatient Medications   Medication Sig Dispense Refill     cholecalciferol, vitamin D3, 1,000 unit tablet Take 1,000 Units by mouth daily.       citalopram (CELEXA) 10 MG tablet Take 2 tablets (20 mg total) by mouth daily. 60 tablet 1     LEVONORGESTREL (MIRENA UTRN) by Intrauterine route.       multivitamin capsule Take 1 capsule by mouth daily.       No current facility-administered medications for this visit.        Imaging & Labs reviewed this visit: after visit while reviewing labs noticed her Pap from 3/2017 shows ASCUS with + HPV high risk types; needs colposcopy.     Objective:      Vitals:    18 1506   BP: 118/78   Pulse: 60   Resp: 16   Weight: 202 lb 9.6 oz (91.9 kg)   Height: 5' 8.75\" (1.746 m)       Physical Exam:     General: Alert, no acute distress.   HEENT: normocephalic conjunctivae are clear. EOMI  Neck: supple   Lungs: Normal effort  Heart: regular rate and rhythm  Abdomen: soft   Skin: clear without rash or lesions  Neuro: alert, oriented  Psych: mood good, affect appropriate, good eye contact, insight " and judgment intact, normal speech pattern. No SI/HI. PHQ9 score of 11.         Little interest or pleasure in doing things: More than half the days  Feeling down, depressed, or hopeless: Several days  Trouble falling or staying asleep, or sleeping too much: More than half the days  Feeling tired or having little energy: Several days  Poor appetite or overeating: More than half the days  Feeling bad about yourself - or that you are a failure or have let yourself or your family down: More than half the days  Trouble concentrating on things, such as reading the newspaper or watching television: Several days  Moving or speaking so slowly that other people could have noticed. Or the opposite - being so fidgety or restless that you have been moving around a lot more than usual: Not at all  Thoughts that you would be better off dead, or of hurting yourself in some way: Not at all  PHQ-9 Total Score: 11  If you checked off any problems, how difficult have these problems made it for you to do your work, take care of things at home, or get along with other people?: Somewhat difficult      Leatha Bernal MD

## 2021-06-22 NOTE — PROGRESS NOTES
A level =25 international units/L indicates that the patient has likely entered the menopausal transition. However, there is no FSH value that would provide absolute reassurance that she is postmenopausal.

## 2021-06-28 ENCOUNTER — COMMUNICATION - HEALTHEAST (OUTPATIENT)
Dept: OBGYN | Facility: CLINIC | Age: 56
End: 2021-06-28

## 2021-06-28 DIAGNOSIS — R87.810 ASCUS WITH POSITIVE HIGH RISK HPV CERVICAL: ICD-10-CM

## 2021-06-28 DIAGNOSIS — R87.610 ASCUS WITH POSITIVE HIGH RISK HPV CERVICAL: ICD-10-CM

## 2021-06-28 NOTE — PROGRESS NOTES
"Progress Notes by Leatha Bernal MD at 3/10/2020  2:20 PM     Author: Leatha Bernal MD Service: -- Author Type: Physician    Filed: 3/10/2020  2:53 PM Encounter Date: 3/10/2020 Status: Signed    : Leatha Bernal MD (Physician)       FEMALE PREVENTATIVE EXAM    Assessment and Plan:   Kaley was seen today for annual exam.    Routine general medical examination at a health care facility  -     Mammo Screening Bilateral; Future  -     Glucose; Future  -     Lipid Cascade; Future  -     HM2(CBC w/o Differential); Future  -     Gynecologic Cytology (PAP Smear)  - Declines flu shot, discussed Shingrix and she is going to consider this     perimenopause vs post menopausal  Based on labs from 2018 (LH 30.8, FSH 73.90, very likely pt is postmenopausal; no menses due to Mirena IUD. She desires to return for removal of this IUD at some point in the near future.  She does have perimenopausal type symptoms with hot flashes, weight gain, difficulty with losing weight, mood swings.  No vaginal dryness.     Screening for cervical cancer  -     Gynecologic Cytology (PAP Smear)    ASCUS with positive high risk HPV cervical  3/2017 pap ASCUS with HPV+  4/17/17- colposcopy with [facility] *see media tab; showed normal visually and bx reported \"cells with HPV effect, very mild, nondiagnostic for dysplasia\".    Overdue for pap, will collect today      Moderate episode of recurrent major depressive disorder (H)  Anxiety  PHQ-9 Total Score: 14 (3/10/2020  2:00 PM)  . NEENA-7 Total: 10 (4/26/2019 12:00 PM)  NEENA 7 Total Score: 11 (3/10/2020  2:00 PM)    Reports stability in her symptoms despite high scores.   After IUD removal, she is going to consider hormone replacement therapy given her mood sx, hot flashes, weight gain, etc. She will think about these.   Mindfulness reviewed.    Encouraged exercise.  Encouraged reconsideration for counseling but at this time she declines.  Refill sent of her Celexa 30mg daily    Next " follow up:  Return in about 4 weeks (around 4/7/2020) for IUD removal .    Immunization Review  Adult Imm Review: Declines immunizations today    I discussed the following with the patient:   Adult Healthy Living: Importance of regular exercise  Healthy nutrition  Weight loss referral options  Getting adequate sleep  Stress management  Use of seat belts  Distracted driving  STI prevention  Contraception options  Supplement use  Herbal medications/alternative medical therapies    I have had an Advance Directives discussion with the patient.    Subjective:   Chief Complaint: Kaley Acevedo is an 54 y.o. female here for a preventative health visit.     HPI:    Chief Complaint   Patient presents with   ? Annual Exam     pt is not fasting      Overall feeling about the same as last year.  No specific updates.  She is looking at changing up her role at [employer] pretty soon.        Despite efforts with increasing physical activity (walks 5-7K steps at [employer] during the work day) and intermittent fasting and eating less sugar, she still struggles with weight loss.  She is going to continue to work on this.      She does have perimenopausal type symptoms starting summer 2018 such as hot flashes, intermittent night terrors, decreased libido and mood swings.  She is interested potentially to remove the IUD in the near future and see how her body regulates.  She has not had a period in a long time due to this Mirena IUD.    She had a recent root canal and she is currently on day 4 of 10 for amoxicillin treatment.    Social History     Social History Narrative     but currently in another relationship. Works at [employer]. 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 No tobacco use. Drinks 1-3 alcoholic beverages a week.     Leatha Bernal MD     Healthy Habits  Are you taking a daily aspirin? No  Do you typically exercising at least 40 min, 3-4 times per week?  NO- 2 times  "  Do you usually eat at least 4 servings of fruit and vegetables a day, include whole grains and fiber and avoid regularly eating high fat foods? NO- tries   Have you had an eye exam in the past two years? Yes  Do you see a dentist twice per year? Yes  Do you have any concerns regarding sleep? No    Safety Screen  If you own firearms, are they secured in a locked gun cabinet or with trigger locks? The patient does not own any firearms  Do you feel you are safe where you are living?: Yes (3/10/2020  2:09 PM)  Do you feel you are safe in your relationship(s)?: Yes (3/10/2020  2:09 PM)      Review of Systems:  Please see above.  The rest of the review of systems are negative for all systems.       Cancer Screening       Status Date      MAMMOGRAM Overdue 7/15/2011      Done 7/15/2010 MAMMO DIAGNOSTIC LEFT     Patient has more history with this topic...    PAP SMEAR Overdue 3/23/2018      Done 3/23/2017 GYNECOLOGIC CYTOLOGY (PAP SMEAR)     Patient has more history with this topic...          Patient Care Team:  Leatha Bernal MD as PCP - General (Family Medicine)  Leatha Bernal MD as Assigned PCP        History     Reviewed By Date/Time Sections Reviewed    Leatha Bernal MD 3/10/2020  2:47 PM Medical, Surgical, Tobacco, Family    Leatha Bernal MD 3/10/2020  2:44 PM Social Documentation    Amirah Quijano [facility] 3/10/2020  2:09 PM Tobacco            Objective:   Vital Signs:   Visit Vitals  /78   Pulse (!) 57   Resp 16   Ht 5' 8.75\" (1.746 m)   Wt 201 lb (91.2 kg)   SpO2 100%   BMI 29.90 kg/m           PHYSICAL EXAM  Constitutional: Patient is oriented to person, place, and time. Patient appears well-developed and well-nourished. No distress.   Head: Normocephalic and atraumatic.   Ears: External ear and TMs normal bilaterally.  Nose: Nose normal.   Mouth/Throat: Oropharynx is clear and moist. No oropharyngeal exudate.   Eyes: Conjunctivae and EOM are normal. Pupils are equal, round, " and reactive to light. No discharge. No scleral icterus.   Neck: Neck supple. No JVD present. No tracheal deviation present. No thyromegaly present.  Breasts: pt declined exam  Cardiovascular: Normal rate, regular rhythm, normal heart sounds and intact distal pulses. No murmur heard.   Pulmonary/Chest: Effort normal and breath sounds normal. Patient has no wheezes, no rales, exhibits no tenderness.   Abdominal: Soft. Bowel sounds are normal. No masses. There is no tenderness.   Lymphadenopathy:  Patient has no cervical adenopathy.   Neurological: Patient is alert and oriented to person, place, and time. Patient has normal reflexes. No cranial nerve deficit. Coordination normal.   Skin: Skin is warm and dry. No rash noted. No pallor.   Pelvic: Normal external genitalia with Normal vulva.  Normal vagina with no polyps or lesions and with physiologic discharge.  Normal cervix with normal mucosa and without CMT.  No adnexal masses . IUD strings in place.  Psychiatric: Patient has good eye contact without any psychomotor retardation or stereotypic behaviors.  normal mood and affect. Judgment and thought content normal.   Speech is regular rate and rhythm.       The ASCVD Risk score (El SERAFIN Jr., et al., 2013) failed to calculate for the following reasons:    Unable to determine if patient is Non- African American         Medication List          Accurate as of March 10, 2020  2:49 PM. If you have any questions, ask your nurse or doctor.            CONTINUE taking these medications    cholecalciferol (vitamin D3) 1,000 unit (25 mcg) tablet  INSTRUCTIONS:  Take 1,000 Units by mouth daily.        * citalopram 10 MG tablet  Also known as:  celeXA  INSTRUCTIONS:  TAKE 1 TABLET BY MOUTH ONCE DAILY, WITH THE 20MG DOSE FOR A TOTAL OF 30MG DAILY.        * citalopram 20 MG tablet  Also known as:  celeXA  INSTRUCTIONS:  TAKE 1 TABLET BY MOUTH, WITH THE 10MG TABLET FOR TOTAL OF 30MG DAILY.        hydroxyzine HCL 25 MG  tablet  Also known as:  ATARAX  INSTRUCTIONS:  Take 1-2 tablets (25-50 mg total) by mouth 3 (three) times a day as needed for anxiety.        MIRENA UTRN  INSTRUCTIONS:  by Intrauterine route.        multivitamin capsule  INSTRUCTIONS:  Take 1 capsule by mouth daily.  Reason for med:  treatment to prevent vitamin deficiency            * This list has 2 medication(s) that are the same as other medications prescribed for you. Read the directions carefully, and ask your doctor or other care provider to review them with you.               Where to Get Your Medications      These medications were sent to [place] [id number] [place] [address]  [address] [place] [id number]    Phone:  [phone]     citalopram 10 MG tablet    citalopram 20 MG tablet         Additional Screenings Completed Today:   PHQ9 score PHQ-9 Total Score: 14 (3/10/2020  2:00 PM)    Little interest or pleasure in doing things: More than half the days  Feeling down, depressed, or hopeless: More than half the days  Trouble falling or staying asleep, or sleeping too much: More than half the days  Feeling tired or having little energy: More than half the days  Poor appetite or overeating: Several days  Feeling bad about yourself - or that you are a failure or have let yourself or your family down: More than half the days  Trouble concentrating on things, such as reading the newspaper or watching television: More than half the days  Moving or speaking so slowly that other people could have noticed. Or the opposite - being so fidgety or restless that you have been moving around a lot more than usual: Several days  Thoughts that you would be better off dead, or of hurting yourself in some way: Not at all  PHQ-9 Total Score: 14  If you checked off any problems, how difficult have these problems made it for you to do your work, take care of things at home, or get along with other people?: Somewhat difficult    GAD7 score  NEENA-7 Total: 10 (2019 12:00 PM)  NEENA 7 Total Score: 11 (3/10/2020  2:00 PM)    How difficult did these problems make it for you to do your work, take care of things at home or get along with other people? : Somewhat difficult (3/10/2020  2:00 PM)  Feeling nervous, anxious, or on edge: 2 (3/10/2020  2:00 PM)  Not being able to stop or control worryin (3/10/2020  2:00 PM)  Worrying too much about different things: 1 (3/10/2020  2:00 PM)  Trouble relaxin (3/10/2020  2:00 PM)  Being so restless that it's hard to sit still: 1 (3/10/2020  2:00 PM)  Becoming easily annoyed or irritable: 2 (3/10/2020  2:00 PM)  Feeling afraid as if something awful might happen: 2 (3/10/2020  2:00 PM)  NEENA 7 Total Score: 11 (3/10/2020  2:00 PM)  How difficult did these problems make it for you to do your work, take care of things at home or get along with other people? : Somewhat difficult (3/10/2020  2:00 PM)

## 2021-07-03 NOTE — ADDENDUM NOTE
Addendum Note by Leatha Bernal MD at 3/26/2020  2:00 PM     Author: Leatha Bernal MD Service: -- Author Type: Physician    Filed: 3/26/2020  2:28 PM Encounter Date: 3/26/2020 Status: Signed    : Leatha Bernal MD (Physician)    Addended by: LEATHA BERNAL on: 3/26/2020 02:28 PM        Modules accepted: Level of Service

## 2021-07-07 NOTE — PROGRESS NOTES
6/22/21 Colpo bx and ECC immature squamous metaplasia favor dysplastic. ASCUS pap, +HR HPV (not 16/18). Plan: cotest in 1 year

## 2021-07-14 PROBLEM — Z97.5 IUD (INTRAUTERINE DEVICE) IN PLACE: Status: RESOLVED | Noted: 2017-02-23 | Resolved: 2020-03-26

## 2021-10-11 ENCOUNTER — HEALTH MAINTENANCE LETTER (OUTPATIENT)
Age: 56
End: 2021-10-11

## 2021-10-13 ENCOUNTER — VIRTUAL VISIT (OUTPATIENT)
Dept: FAMILY MEDICINE | Facility: CLINIC | Age: 56
End: 2021-10-13
Payer: COMMERCIAL

## 2021-10-13 DIAGNOSIS — R63.5 WEIGHT GAIN: Primary | ICD-10-CM

## 2021-10-13 PROCEDURE — 99213 OFFICE O/P EST LOW 20 MIN: CPT | Mod: 95 | Performed by: FAMILY MEDICINE

## 2021-10-13 RX ORDER — PHENTERMINE HYDROCHLORIDE 30 MG/1
30 CAPSULE ORAL EVERY MORNING
Qty: 30 CAPSULE | Refills: 0 | Status: SHIPPED | OUTPATIENT
Start: 2021-10-13 | End: 2021-11-17

## 2021-10-13 NOTE — PROGRESS NOTES
VIDEO VISIT--> converted to phone visit as pt could not access link  Due to current COVID19 pandemic, pt was offered virtual visit in lieu of in office evaluation to limit exposures.      Assessment/plan  Kaley Acevedo is a 56 year old female who is established to my practice.    Weight gain  BMI 29 currently; at a plateou and already doing diet/exercise changes. Interested in phentermine which has been helpful in short course for her in the past. Ok to start at the 30mg dose which she previously tolerated. Will follow up in 1 month, then anticipate sending in  Further refills if doing well.   Side effects reviewed including risk for racing heartbeat, palpitations, nausea, change in sleep and appetite (desired here).   - phentermine (ADIPEX-P) 30 MG capsule  Dispense: 30 capsule; Refill: 0         COVID19 precautions reviewed, questions answered. Referred to CDC for latest updates.     Follow up: Return in about 4 weeks (around 11/10/2021) for Med check for controlled medication.    Leatha Bernal MD    Visit Details   visit start time: 8:43am   visit end time: 8:59am  Total time: 13min  Originating Location (pt. Location): Home  Distant Location (provider location):  [facility]   Mode of Communication:  Video Conference via [employer] then converted to phone visit        Subjective:      HPI: Kaley Acevedo is a 56 year old female who is being evaluated via a billable video visit due to COVID19 pandemic precautions.    Chief Complaint   Patient presents with     Medication Request     would like to discuss starting phentermine for weight loss       Discuss weight loss medication: Wondering about the option to start phentermine. Has noticed a plateau in her weight loss journey. Has been on this years ago with success and also her sister is currently having success with this medication. This is what prompted her to ask about it today.    She is doing a lot with regard to diet  and exercise modifications.  She has a hard time limiting her sweet cravings & snacks, by late afternoon it's the worst.     Starting to affect her physically and emotionally.      Current weight 205lbs    Wellbutrin at 450mg daily,  Celexa 20mg daily are her other medications  Hydroxyzine didn't do well for her so not taking this    Wt Readings from Last 3 Encounters:   03/26/20 89.2 kg (196 lb 9.6 oz)   03/10/20 91.2 kg (201 lb)   11/29/18 91.9 kg (202 lb 9.6 oz)       Review of Systems:  12 point comprehensive review of systems was negative except as noted and HPI     Social History:  Social History     Social History Narrative    Re- Spring 2021. Works at [employer]. Pilates & Dance classes   No tobacco use. Drinks 1-3 alcoholic beverages a week.   Leatha Bernal MD         Medical History:   I have reviewed and updated the patient's Past Medical History, Social History, Family History and Medication List.    Medications:  Current Outpatient Medications   Medication Sig Dispense Refill     buPROPion (WELLBUTRIN XL) 150 MG 24 hr tablet [BUPROPION (WELLBUTRIN XL) 150 MG 24 HR TABLET] Take 1 tablet (150 mg total) by mouth every morning. 30 tablet 2     buPROPion (WELLBUTRIN XL) 300 MG 24 hr tablet [BUPROPION (WELLBUTRIN XL) 300 MG 24 HR TABLET] Take 1 tablet (300 mg total) by mouth every morning. 90 tablet 3     cholecalciferol, vitamin D3, 1,000 unit tablet [CHOLECALCIFEROL, VITAMIN D3, 1,000 UNIT TABLET] Take 1,000 Units by mouth daily.       citalopram (CELEXA) 20 MG tablet [CITALOPRAM (CELEXA) 20 MG TABLET] Take 1 tablet (20 mg total) by mouth daily. 90 tablet 3     multivitamin capsule [MULTIVITAMIN CAPSULE] Take 1 capsule by mouth daily.       phentermine (ADIPEX-P) 30 MG capsule Take 1 capsule (30 mg) by mouth every morning 30 capsule 0       Imaging & Labs reviewed this visit: No results found for: HGBA1C  Lab Results   Component Value Date    TSH 1.47 12/03/2018     No results  "found for: CREATININE  No components found for: K      Objective:     Vitals - Patient Reported  Weight (Patient Reported): 93 kg (205 lb)  Height (Patient Reported): 177.8 cm (5' 10\")  BMI (Based on Pt Reported Ht/Wt): 29.41             Physical Exam:     General:sounds well, good insight and judgement    Leatha Bernal MD      "

## 2021-11-17 ENCOUNTER — MYC REFILL (OUTPATIENT)
Dept: FAMILY MEDICINE | Facility: CLINIC | Age: 56
End: 2021-11-17
Payer: COMMERCIAL

## 2021-11-17 DIAGNOSIS — R63.5 WEIGHT GAIN: ICD-10-CM

## 2021-11-19 NOTE — TELEPHONE ENCOUNTER
Routing refill request to provider for review/approval because:  Controlled substance request    Last Written Prescription Date:  10/13/21  Last Fill Quantity: 30,  # refills: 0   Last office visit provider:  10/13/21     Requested Prescriptions   Pending Prescriptions Disp Refills     phentermine (ADIPEX-P) 30 MG capsule 30 capsule 0     Sig: Take 1 capsule (30 mg) by mouth every morning       There is no refill protocol information for this order          Cale Hargrove RN 11/19/21 10:57 AM

## 2021-11-20 RX ORDER — PHENTERMINE HYDROCHLORIDE 30 MG/1
30 CAPSULE ORAL EVERY MORNING
Qty: 30 CAPSULE | Refills: 0 | Status: SHIPPED | OUTPATIENT
Start: 2021-11-20 | End: 2021-12-08

## 2021-12-08 ENCOUNTER — OFFICE VISIT (OUTPATIENT)
Dept: FAMILY MEDICINE | Facility: CLINIC | Age: 56
End: 2021-12-08
Payer: COMMERCIAL

## 2021-12-08 VITALS
WEIGHT: 195 LBS | BODY MASS INDEX: 27.98 KG/M2 | SYSTOLIC BLOOD PRESSURE: 126 MMHG | HEART RATE: 69 BPM | OXYGEN SATURATION: 99 % | DIASTOLIC BLOOD PRESSURE: 84 MMHG

## 2021-12-08 DIAGNOSIS — F41.9 ANXIETY: ICD-10-CM

## 2021-12-08 DIAGNOSIS — F33.1 MODERATE EPISODE OF RECURRENT MAJOR DEPRESSIVE DISORDER (H): ICD-10-CM

## 2021-12-08 DIAGNOSIS — Z71.3 WEIGHT LOSS COUNSELING, ENCOUNTER FOR: Primary | ICD-10-CM

## 2021-12-08 PROCEDURE — 99214 OFFICE O/P EST MOD 30 MIN: CPT | Performed by: FAMILY MEDICINE

## 2021-12-08 RX ORDER — PHENTERMINE HYDROCHLORIDE 30 MG/1
30 CAPSULE ORAL EVERY MORNING
Qty: 30 CAPSULE | Refills: 0 | Status: SHIPPED | OUTPATIENT
Start: 2021-12-18 | End: 2022-01-07

## 2021-12-08 ASSESSMENT — ANXIETY QUESTIONNAIRES
IF YOU CHECKED OFF ANY PROBLEMS ON THIS QUESTIONNAIRE, HOW DIFFICULT HAVE THESE PROBLEMS MADE IT FOR YOU TO DO YOUR WORK, TAKE CARE OF THINGS AT HOME, OR GET ALONG WITH OTHER PEOPLE: SOMEWHAT DIFFICULT
GAD7 TOTAL SCORE: 9
6. BECOMING EASILY ANNOYED OR IRRITABLE: SEVERAL DAYS
7. FEELING AFRAID AS IF SOMETHING AWFUL MIGHT HAPPEN: MORE THAN HALF THE DAYS
1. FEELING NERVOUS, ANXIOUS, OR ON EDGE: SEVERAL DAYS
3. WORRYING TOO MUCH ABOUT DIFFERENT THINGS: MORE THAN HALF THE DAYS
2. NOT BEING ABLE TO STOP OR CONTROL WORRYING: SEVERAL DAYS
5. BEING SO RESTLESS THAT IT IS HARD TO SIT STILL: SEVERAL DAYS

## 2021-12-08 ASSESSMENT — PATIENT HEALTH QUESTIONNAIRE - PHQ9: 5. POOR APPETITE OR OVEREATING: SEVERAL DAYS

## 2021-12-08 NOTE — PROGRESS NOTES
Assessment/plan   Kaley Acevedo is a 56 year old female who is established to my practice.    Kaley was seen today for medication refill.    Diagnoses and all orders for this visit:    Weight loss counseling, encounter for  BMI currently 27.8 (previously closer to 30).  She is doing diet/exercise changes.  Started a course of phentermine 30 mg 10/2021 with desired impact of 10 to 15 pound weight loss she suspects from her home tracking weights.  She would like to continue this for another 1 to 2 months we discussed this would be reasonable.  - PMDP reviewed, no concerns. Filled 10/13 and 11/20 of this year.  - CSA not signed as this is intended to be ~4 month course.   -Refill sent today.  She can message us in approximately mid January for her fourth prescription and likely final prescription of the phentermine.    -Reviewed basal metabolic rate and her minimum caloric needs is likely closer to 1700 robin/day if not more to sustain the calorie needs for her energy expenditures.  Reviewed goal to avoid starvation mode which increases cortisol and thus promotes weight regain.  - continue exercise as able; strength training encouraged  - Continue great dietary habits that have been implemented thus far  -     phentermine (ADIPEX-P) 30 MG capsule; Take 1 capsule (30 mg) by mouth every morning    - option for labs today but declines for now.       Anxiety, depression  Improving with recent weight loss success. Continue wellbutrin 450mg and celexa 20mg daily      Follow up: Return in about 3 months (around 3/8/2022) for Recheck.      Leatha Bernal MD    Subjective:      HPI: Kaley Acevedo is a 56 year old female who is here for:    Chief Complaint   Patient presents with     Medication Refill       Med check for phentermine: Started phentermine 30mg in mid October.  She feels pretty good with this. She finds it helps curb the cravings and lets her plan better and not consumed by cravings. Finding her awarness  of food is more present and more mindful.     Feels she is down about 10-15lbs since starting this.   Improves her mood to know her weight is on the right track.     She is doing a lot with regard to diet and exercise modifications.  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 Wellbutrin at 450mg daily,  Celexa 20mg daily are her other medications  Hydroxyzine didn't do well for her so not taking this     Wt Readings from Last 3 Encounters:   21 88.5 kg (195 lb)   20 89.2 kg (196 lb 9.6 oz)   03/10/20 91.2 kg (201 lb)       Review of Systems:  12 point comprehensive review of systems was negative except as noted and HPI     Social History:  Social History     Social History Narrative    Re- Spring 2021. Works in retail. Enjoys Pilates & Dance classes   No tobacco use. Drinks 1-3 alcoholic beverages a week.  Daughters nearby- young grandson that she gets to see regularily.  Leatha Bernal MD         Medical History:  Patient Active Problem List   Diagnosis     Urge And Stress Incontinence     Hyperlipidemia     Obesity     Chronic Major Depression     Anxiety     Family history of colon cancer     Polyp of colon     ASCUS with positive high risk HPV cervical     Past Medical History:   Diagnosis Date     Anxiety      Benign neoplasm of colon     Created by [facility] Annotation: Mar 26 2007  1:03PM - Vivian Alvarado: colonscopy Q 5  years      Depression      Hypercholesteremia      Past Surgical History:   Procedure Laterality Date     APPENDECTOMY  spring 2011     BIOPSY BREAST Left     BENIGN     C  DELIVERY ONLY      Description:  Section;  Recorded: 2008;       SECTION  1991     HEMORRHOIDECTOMY INTERNAL LIGATION      Description: Hemorrhoidectomy;  Recorded: 2008;     Patient has no known allergies.  Family History   Problem Relation Age of Onset     Myelodysplastic syndrome Father      Skin Cancer Mother      Diabetes Mother      Breast Cancer Mother      Hypertension Sister      Hyperlipidemia Sister      Colon Cancer Sister 32.00         6 months later     Hypertension Brother      Hyperlipidemia Brother        Medications:  Current Outpatient Medications   Medication     buPROPion (WELLBUTRIN XL) 150 MG 24 hr tablet     buPROPion (WELLBUTRIN XL) 300 MG 24 hr tablet     cholecalciferol, vitamin D3, 1,000 unit tablet     citalopram (CELEXA) 20 MG tablet     multivitamin capsule     [START ON 2021] phentermine (ADIPEX-P) 30 MG capsule     No current facility-administered medications for this visit.         Imaging & Labs reviewed this visit: none      Objective:      Vitals:    21 1023   BP: 126/84   Pulse: 69   SpO2: 99%   Weight: 88.5 kg (195 lb)       Physical Exam:   General: Alert, no acute distress.   HEENT: normocephalic conjunctivae are clear. EOMI  Neck: supple   Lungs: Normal effort  Heart: regular rate  Abdomen: soft   Skin: clear without rash or lesions  Neuro: alert, oriented  Psych: mood good, affect appropriate, good eye contact, insight and judgment intact, normal speech pattern.      Leatha Bernal MD

## 2021-12-09 ASSESSMENT — ANXIETY QUESTIONNAIRES: GAD7 TOTAL SCORE: 9

## 2021-12-09 ASSESSMENT — PATIENT HEALTH QUESTIONNAIRE - PHQ9: SUM OF ALL RESPONSES TO PHQ QUESTIONS 1-9: 4

## 2021-12-13 NOTE — PROGRESS NOTES
Chief Complaint   Patient presents with     Contraception     remove IUD today, would like clarification on Pap results       IUD Removal Procedure Note    This 54 y.o. female presents today for removal of her IUD, which was inserted 5 ago. Kaley reports she is now menopausal    Procedure Details   I discussed with the patient the potential risks and benefits of IUD removal. The alternatives forms of birth control were discussed with the patient.  The patient understands the procedure, has had ample time to ask questions Verbal informed consent was obtained.      Patient was positioned and the IUD strings were visualized.  The strings were grasped with forceps and the IUD was gently removed.    Condition:  Stable    Complications:  None. Patient tolerated procedure well.    Plan:  The patient was advised to call for any fever or for prolonged or severe pain or bleeding.       ASCUS with +HPV:  We also discussed her recent Pap results from 3/10/2020 which are ASCUS with HPV 16+ and other high risk HPV positive.  Per ASCCP guidelines she is due for colposcopy however in light of coronavirus pandemic recommendations have been made by our guiding organization to defer ASCUS/LSIL Paps until the fall.  We will coordinate scheduling this colposcopy with her later.     numerical 0-10

## 2022-01-06 ENCOUNTER — MYC MEDICAL ADVICE (OUTPATIENT)
Dept: FAMILY MEDICINE | Facility: CLINIC | Age: 57
End: 2022-01-06
Payer: COMMERCIAL

## 2022-01-06 DIAGNOSIS — Z71.3 WEIGHT LOSS COUNSELING, ENCOUNTER FOR: ICD-10-CM

## 2022-01-07 RX ORDER — PHENTERMINE HYDROCHLORIDE 30 MG/1
30 CAPSULE ORAL EVERY MORNING
Qty: 30 CAPSULE | Refills: 0 | Status: SHIPPED | OUTPATIENT
Start: 2022-01-17 | End: 2022-08-16

## 2022-06-03 ENCOUNTER — PATIENT OUTREACH (OUTPATIENT)
Dept: FAMILY MEDICINE | Facility: CLINIC | Age: 57
End: 2022-06-03
Payer: COMMERCIAL

## 2022-06-03 NOTE — LETTER
Maida 3, 2022      Kaley Acevedo  [address]  [place] [address]        Dear ,    This letter is to remind you that you are due for your follow-up Pap smear and Human Papillomavirus (HPV) test.    Please call [phone] to schedule your appointment at your earliest convenience.    If you have completed the appointment outside of the [facility] system, please have the records forwarded to our office. We will update your chart for your provider to review before your next annual wellness visit.     Thank you for choosing [facility]!      Sincerely,    Your [facility] Care Team

## 2022-07-13 DIAGNOSIS — F41.9 ANXIETY: ICD-10-CM

## 2022-07-13 DIAGNOSIS — F33.1 MODERATE EPISODE OF RECURRENT MAJOR DEPRESSIVE DISORDER (H): ICD-10-CM

## 2022-07-14 RX ORDER — BUPROPION HYDROCHLORIDE 300 MG/1
TABLET ORAL
Qty: 30 TABLET | Refills: 5 | Status: SHIPPED | OUTPATIENT
Start: 2022-07-14 | End: 2023-10-13

## 2022-07-14 RX ORDER — CITALOPRAM HYDROBROMIDE 20 MG/1
TABLET ORAL
Qty: 30 TABLET | Refills: 5 | Status: SHIPPED | OUTPATIENT
Start: 2022-07-14 | End: 2023-09-12

## 2022-07-14 NOTE — TELEPHONE ENCOUNTER
"Routing refill request to provider for review/approval because:  Patient needs to be seen because it has been more than 6 months since last office visit.  Please review last PHQ-9 score    Last Written Prescription Date:  6/22/21  Last Fill Quantity: 90,  # refills: 3   Last office visit provider:  12/8/21     Requested Prescriptions   Pending Prescriptions Disp Refills     citalopram (CELEXA) 20 MG tablet [Pharmacy Med Name: CITALOPRAM HBR 20 MG TABLET] 30 tablet 11     Sig: TAKE 1 TABLET BY MOUTH EVERY DAY       SSRIs Protocol Failed - 7/13/2022 12:28 PM        Failed - PHQ-9 score less than 5 in past 6 months     Please review last PHQ-9 score.           Failed - Recent (6 mo) or future (30 days) visit within the authorizing provider's specialty     Patient had office visit in the last 6 months or has a visit in the next 30 days with authorizing provider or within the authorizing provider's specialty.  See \"Patient Info\" tab in inbasket, or \"Choose Columns\" in Meds & Orders section of the refill encounter.            Passed - Medication is Bupropion     If the medication is Bupropion (Wellbutrin), and the patient is taking for smoking cessation; OK to refill.          Passed - Medication is active on med list        Passed - Patient is age 18 or older        Passed - No active pregnancy on record        Passed - No positive pregnancy test in last 12 months       Steffi Rosenberg RN 07/14/22 12:58 AM  "

## 2022-07-14 NOTE — TELEPHONE ENCOUNTER
"Routing refill request to provider for review/approval because:  Patient needs to be seen because it has been more than 6 months since last office visit.  Please review last PHQ-9 score    Last Written Prescription Date:  6/22/21  Last Fill Quantity: 90,  # refills: 3   Last office visit provider:  12/8/21     Requested Prescriptions   Pending Prescriptions Disp Refills     buPROPion (WELLBUTRIN XL) 300 MG 24 hr tablet [Pharmacy Med Name: BUPROPION HCL  MG TABLET] 30 tablet 11     Sig: TAKE 1 TABLET BY MOUTH EVERY DAY IN THE MORNING       SSRIs Protocol Failed - 7/13/2022 12:28 PM        Failed - PHQ-9 score less than 5 in past 6 months     Please review last PHQ-9 score.           Failed - Recent (6 mo) or future (30 days) visit within the authorizing provider's specialty     Patient had office visit in the last 6 months or has a visit in the next 30 days with authorizing provider or within the authorizing provider's specialty.  See \"Patient Info\" tab in inbasket, or \"Choose Columns\" in Meds & Orders section of the refill encounter.            Passed - Medication is Bupropion     If the medication is Bupropion (Wellbutrin), and the patient is taking for smoking cessation; OK to refill.          Passed - Medication is active on med list        Passed - Patient is age 18 or older        Passed - No active pregnancy on record        Passed - No positive pregnancy test in last 12 months             Steffi Rosenberg RN 07/14/22 12:59 AM  "

## 2022-08-16 ENCOUNTER — OFFICE VISIT (OUTPATIENT)
Dept: FAMILY MEDICINE | Facility: CLINIC | Age: 57
End: 2022-08-16
Payer: COMMERCIAL

## 2022-08-16 VITALS
WEIGHT: 192.9 LBS | SYSTOLIC BLOOD PRESSURE: 120 MMHG | HEART RATE: 68 BPM | BODY MASS INDEX: 27.68 KG/M2 | DIASTOLIC BLOOD PRESSURE: 86 MMHG

## 2022-08-16 DIAGNOSIS — F33.1 MODERATE EPISODE OF RECURRENT MAJOR DEPRESSIVE DISORDER (H): ICD-10-CM

## 2022-08-16 DIAGNOSIS — R68.82 DECREASED LIBIDO: ICD-10-CM

## 2022-08-16 DIAGNOSIS — F41.9 ANXIETY: ICD-10-CM

## 2022-08-16 DIAGNOSIS — H91.91 DECREASED HEARING OF RIGHT EAR: Primary | ICD-10-CM

## 2022-08-16 DIAGNOSIS — Z71.3 WEIGHT LOSS COUNSELING, ENCOUNTER FOR: ICD-10-CM

## 2022-08-16 PROCEDURE — 99214 OFFICE O/P EST MOD 30 MIN: CPT | Performed by: FAMILY MEDICINE

## 2022-08-16 ASSESSMENT — ANXIETY QUESTIONNAIRES
5. BEING SO RESTLESS THAT IT IS HARD TO SIT STILL: NOT AT ALL
1. FEELING NERVOUS, ANXIOUS, OR ON EDGE: MORE THAN HALF THE DAYS
2. NOT BEING ABLE TO STOP OR CONTROL WORRYING: MORE THAN HALF THE DAYS
3. WORRYING TOO MUCH ABOUT DIFFERENT THINGS: MORE THAN HALF THE DAYS
GAD7 TOTAL SCORE: 12
6. BECOMING EASILY ANNOYED OR IRRITABLE: MORE THAN HALF THE DAYS
7. FEELING AFRAID AS IF SOMETHING AWFUL MIGHT HAPPEN: MORE THAN HALF THE DAYS
IF YOU CHECKED OFF ANY PROBLEMS ON THIS QUESTIONNAIRE, HOW DIFFICULT HAVE THESE PROBLEMS MADE IT FOR YOU TO DO YOUR WORK, TAKE CARE OF THINGS AT HOME, OR GET ALONG WITH OTHER PEOPLE: SOMEWHAT DIFFICULT
4. TROUBLE RELAXING: MORE THAN HALF THE DAYS

## 2022-08-16 ASSESSMENT — PATIENT HEALTH QUESTIONNAIRE - PHQ9: SUM OF ALL RESPONSES TO PHQ QUESTIONS 1-9: 14

## 2022-08-16 NOTE — PATIENT INSTRUCTIONS
"Trial of glucosamine supplement or tumeric    Podcast: Weight loss for Busy Physicians * with Dr. Juana Hernandes  *just insert your own career/barriers with any reference to busy physician world and you will still get phenomenal tips for the mindset changes needed for sustained weight loss.  She is also coming out with a book for everyone.      Weight Loss Strategies for Success: (start small, pick 1 or 2 goals each week)    Stop snacking. Three meals a day only. This is THE MAJOR  for insulin resistance. We need to change the insulin resistance to have sustained weight loss.   Keep a food log- apps like [employer] are very helpful for this. (Not to pay attention to the calories but just to track the food).   Plan your food log in advance- Night prior: pick out what you want to eat for the next day for your 3 meals and stick to it: protein, healthy fat, veggie for example at every meal.  High protein (60g/day): Try to get protein in at least every 3.5 hours during the day to avoid a hunger surge late in the day.  If hungry, start with a protein serving, followed by water and then a crunchy vegetable that requires chewing (this decreases the hunger hormone).   Use plenty of healthy fats (olive oil, avocados, nuts) when cooking which will make you feel more satisfied.    Be intentional and think about what you are eating and feel the food fuel you.  Have a  \"Table, chair, plate\" with every meal. Sit down to eat your food!  Brush your teeth after the last meal of the day. Prevents evening snacking.   Avoid sugary beverages. (pop, fancy coffees). Reduce alcohol.   Drink water instead.   No fast food (or reduce meals out to a specific #/week). Eat at home 90% of the time.  Start the morning with breakfast.  Choose one day to be \"meal planning\" and/or \"meal prep\" days. Plan ahead for grocery shopping for healthy food choices, and spend 30min-1hr each week prepping your fruits/veggies (wash, chop, store in easy grab " portions).  Make it easy to grab the protein (cut up chicken breasts, greek yogurt containers, hard boiled eggs, etc)  Start low intensity exercise 30 minutes/day (walking/hiking/yard work).  Goal for 10,000 steps per day (consider a FitBit or other tracking device).  Daily weights can help- but just use it as a data point; don't get stuck in the thoughts around that number.

## 2022-08-16 NOTE — PROGRESS NOTES
"  Assessment & Plan     Decreased hearing of right ear  Advised recheck hearing; may benefit from aids  - Adult Audiology  Referral; Future    Decreased libido  Long conversation today regarding nature of spontaneous versus responsive libido and management of his going forward.  She was given options such as sexual health therapy or other referrals but declines at this time.  She understands that her SSRI Celexa may be contributing somewhat to the low libido but also has the benefit of improving her mood and she does have the Wellbutrin for augmentation already on board.  She declines a medication change at this time.  She is going to bring up some these conversations with her  and refer to podcast for references as well.    Moderate episode of recurrent major depressive disorder (H)  Anxiety  Suboptimal control at this time.  Declines a change in medication.  She is going to start looking into a podcast regarding weight loss which she thinks may be also helpful for her mood.    Weight loss counseling, encounter for  Reviewed additional resources today including new book by author Dr. Juana Hernandes \"How to Loose Weight for the Last Time: Brain Based Solutions for Permanent Weight Loss\" and her podcast (techinically geared for physicians but she has an evidence based approach for sustained weight loss with strategies all individuals can benefit from and we reviewed the nuances of how to navigate these podcasts together).     If she desires possible referral to MT for injectable weight loss medication down the road for exploring insurance coverage we can place this.    She declines Pap today and will do this at her physical in a few months. Reviewed her labs with her previously.      Return in about 3 months (around 11/16/2022) for Annual physical.    Leatha Bernal MD  [facility]   Kaley is a 57 year old, presenting for the following health " issues:  Ear Problem (Right ear feels plugged, has been dealing with ear issues since having vertigo Feb/March 2021) and Gyn Exam (Due for pap)      HPI     Right ear plugged feeling; x 1 year with decreased hearing/muffled. Hearing test last year was notable for slightly reduced hearing on the right side.   Last spring had 2/2021 had severe vertigo/MRI was reassuring; lasted 3 months; didn't need PT.   Some tinnitus on the right side.     Mood has been challenging- she feels her anxiety and depression has been more intense over the past 3-4 months. She was doing well with her weight loss in the past (phentermine helped) but weight has stalled again.  Cravings are bothering her. Low libido and this is really frustrating for her.   Doesn't want to go higher on her meds.     She is exercising 1-3x/week but stopped due to bilateral knee pain and hip pains- feels like it's arthritis.    Was down about 8lbs from today's weight but gained it back  She is thinking about trying some OTC supplements for joints- we talked about glucosamine and tumeric            Review of Systems         Objective    /86 (BP Location: Left arm, Patient Position: Sitting, Cuff Size: Adult Regular)   Pulse 68   Wt 87.5 kg (192 lb 14.4 oz)   BMI 27.68 kg/m    Body mass index is 27.68 kg/m .  Physical Exam   GENERAL: healthy, alert and no distress  NECK: no adenopathy, no asymmetry, masses, or scars and thyroid normal to palpation  RESP: lungs clear to auscultation - no rales, rhonchi or wheezes  CV: regular rate and rhythm, normal S1 S2, no S3 or S4, no murmur, click or rub, no peripheral edema and peripheral pulses strong  ABDOMEN: soft, nontender, no hepatosplenomegaly, no masses and bowel sounds normal  MS: no gross musculoskeletal defects noted, no edema  Psych: mood is slightly tearful/down, affect congruent; normal insight and judgement; no HI/SI; good eye contact. Speech is regular rate and normal tone.                    .  ..

## 2022-09-19 ENCOUNTER — MYC MEDICAL ADVICE (OUTPATIENT)
Dept: FAMILY MEDICINE | Facility: CLINIC | Age: 57
End: 2022-09-19

## 2022-09-19 DIAGNOSIS — E66.09 OBESITY DUE TO EXCESS CALORIES, UNSPECIFIED CLASSIFICATION, UNSPECIFIED WHETHER SERIOUS COMORBIDITY PRESENT: Primary | ICD-10-CM

## 2022-09-19 NOTE — TELEPHONE ENCOUNTER
See Alf from Patient needing PCP review.    From 8//16/022 Office Visit  If she desires possible referral to MTM for injectable weight loss medication down the road for exploring insurance coverage we can place this.    TOI Acuña  [facility]

## 2022-09-25 ENCOUNTER — HEALTH MAINTENANCE LETTER (OUTPATIENT)
Age: 57
End: 2022-09-25

## 2022-10-03 NOTE — PROGRESS NOTES
Medication Therapy Management (MTM) Encounter    ASSESSMENT:                            1. Obesity   Patient is interested in injectable weight loss medication. Given back order with [facility], discussed starting Ozempic, which is on formulary, and patient is agreeable. Medication education and counseling was provided, including indication, expected effect, dosing instructions, and possible side effects. Patient demonstrated understanding of injection technique with use of demo pen. The patient's questions were answered. Encouraged continued lifestyle modification with healthy eating and exercise.   If not affordable, also discussed option for oral naltrexone to be used in conjunction with her bupropion (combination medication Contrave), which can reduce cravings and assist with weight loss goal.     2. Depression and anxiety  Did not discuss in detail today. Per last PCP visit, room for improvement, but patient declines medication adjustment. Prefers to focus on non-pharmacologic modalities and weight loss which may improve mood as well.     3. Takes dietary supplements  Reviewed current supplement use with patient. Reasonable to continue.       PLAN:                            If covered/affordable, start Ozempic. Inject 0.25 mg once a week for 4 weeks, then increase to 0.5 mg injected once a week.       Follow-up: Return in about 2 months (around 12/4/2022).    SUBJECTIVE/OBJECTIVE:                          Kaley Hernandez is a 57 year old female coming in for an initial visit. She was referred to me from Leatha Mason.      Reason for visit: weight loss medication.    Allergies/ADRs: Reviewed in chart  Past Medical History: Reviewed in chart  Tobacco: She reports that she has never smoked. She has never used smokeless tobacco.  Alcohol: Reviewed in chart    Medication Adherence/Access: no issues reported    1. Obesity   Interested in medication to help with weight loss. She was doing well with her  "weight loss in the past, but weight has stalled again. Was on phentermine previously. Endorses emotional eating, especially in the evening when cravings are the highest. Lots of sugar cravings. Meeting with dietician and working on healthy eating for weight loss along with exercise.      Estimated body mass index is 27.68 kg/m  as calculated from the following:    Height as of 2/9/21: 5' 10\" (1.778 m).    Weight as of 8/16/22: 192 lb 14.4 oz (87.5 kg).    2. Depression and anxiety  Kaley is taking citalopram 20 mg daily and bupropion  mg daily. Per last visit with PCP, mood has been challenging- she feels her anxiety and depression has been more intense over the past 3-4 months. Also with low libido. Doesn't want to go higher on her meds.     PHQ 6/22/2021 12/8/2021 8/16/2022   PHQ-9 Total Score 7 4 14   Q9: Thoughts of better off dead/self-harm past 2 weeks Not at all Not at all Not at all     NEENA-7 SCORE 4/13/2021 6/22/2021 12/8/2021   Total Score 10 6 9       3. Takes dietary supplements  Kaley is taking vitamin D 1000 units daily and multivitamin daily.     No results found for: VITDT      Vitals:   BP Readings from Last 3 Encounters:   08/16/22 120/86   12/08/21 126/84   06/22/21 104/74      Pulse Readings from Last 3 Encounters:   08/16/22 68   12/08/21 69   06/22/21 60     Wt Readings from Last 3 Encounters:   08/16/22 192 lb 14.4 oz (87.5 kg)   12/08/21 195 lb (88.5 kg)   03/26/20 196 lb 9.6 oz (89.2 kg)     ----------------    I spent 30 minutes with this patient today. All changes were made via collaborative practice agreement with Leatha Bernal MD. A copy of the visit note was provided to the patient's provider(s).    The patient was given a summary of these recommendations.     Jaquelin Mcelroy, PharmD, BCACP  Medication Management (MTM) Pharmacist  [facility]        Medication Therapy Recommendations  Obesity, unspecified    Current Medication: " semaglutide (OZEMPIC, 0.25 OR 0.5 MG/DOSE,) 2 MG/1.5ML SOPN pen   Rationale: Untreated condition - Needs additional medication therapy - Indication   Recommendation: Start Medication   Status: Accepted per CPA

## 2022-10-04 ENCOUNTER — OFFICE VISIT (OUTPATIENT)
Dept: PHARMACY | Facility: CLINIC | Age: 57
End: 2022-10-04
Payer: COMMERCIAL

## 2022-10-04 DIAGNOSIS — E66.09 OBESITY DUE TO EXCESS CALORIES, UNSPECIFIED CLASSIFICATION, UNSPECIFIED WHETHER SERIOUS COMORBIDITY PRESENT: ICD-10-CM

## 2022-10-04 DIAGNOSIS — E88.810 METABOLIC SYNDROME X: Primary | ICD-10-CM

## 2022-10-04 DIAGNOSIS — Z78.9 TAKES DIETARY SUPPLEMENTS: ICD-10-CM

## 2022-10-04 DIAGNOSIS — F33.9 EPISODE OF RECURRENT MAJOR DEPRESSIVE DISORDER, UNSPECIFIED DEPRESSION EPISODE SEVERITY (H): ICD-10-CM

## 2022-10-04 PROCEDURE — 99607 MTMS BY PHARM ADDL 15 MIN: CPT | Performed by: PHARMACIST

## 2022-10-04 PROCEDURE — 99605 MTMS BY PHARM NP 15 MIN: CPT | Performed by: PHARMACIST

## 2022-10-04 RX ORDER — SEMAGLUTIDE 1.34 MG/ML
INJECTION, SOLUTION SUBCUTANEOUS
Qty: 1.5 ML | Refills: 1 | Status: SHIPPED | OUTPATIENT
Start: 2022-10-04 | End: 2022-10-05

## 2022-10-04 NOTE — PATIENT INSTRUCTIONS
"Recommendations from today's Medication Management (MTM) visit:                                                      If covered/affordable, start Ozempic. Inject 0.25 mg once a week for 4 weeks, then increase to 0.5 mg injected once a week.       To schedule another MTM appointment, please call the MTM scheduling line at [phone] or toll-free at [phone].     My MTM pharmacist's contact information:      Please feel free to contact me with any questions or concerns you have via [employer] or calling the clinic.       Jaquelin Mcelroy, PharmD, [facility]  Medication Management (MTM) Pharmacist  [facility]     It was great speaking with you today.  I value your experience and would be very thankful for your time in providing feedback in our clinic survey. In the next few days, you may receive an email or text message from [employer] with a link to a survey related to your  clinical pharmacist.\"     "

## 2022-10-06 NOTE — TELEPHONE ENCOUNTER
Francisco Bernal,   Patient is lost to pap tracking follow-up. Attempts to contact pt have been made per reminder process and there has been no reply and/or no appt scheduled.  If you are wanting any additional contact attempts please send to your care team staff.       Pap Hx:  4/22/10 NIL  9/13/13 NIL  3/23/17 ASCUS, +HR HPV 16  4/17/17 Colpo bx inflammation, reactive changes, cells suggestive of HPV effect  3/10/20 ASCUS, +HR HPV 16 & other. [place] deferred due to COVID  6/22/21 Colpo bx and ECC immature squamous metaplasia favor dysplastic.   ASCUS pap, +HR HPV (not 16/18). Plan: cotest in 1 year  6/29/21 Pt notified   06/3/22 Reminder Min, Letter  08/16/22 appt scheduled notes added-- pt declined pap and said that she would do this at her px in a few months.   09/12/22 Reminder call-nadir  10/6/22 Lost to follow-up for pap tracking, angelica routed to provider

## 2022-11-02 ENCOUNTER — OFFICE VISIT (OUTPATIENT)
Dept: DERMATOLOGY | Facility: CLINIC | Age: 57
End: 2022-11-02
Payer: COMMERCIAL

## 2022-11-02 DIAGNOSIS — D22.9 MULTIPLE BENIGN NEVI: Primary | ICD-10-CM

## 2022-11-02 DIAGNOSIS — D48.5 NEOPLASM OF UNCERTAIN BEHAVIOR OF SKIN: ICD-10-CM

## 2022-11-02 PROCEDURE — 99202 OFFICE O/P NEW SF 15 MIN: CPT | Mod: 25 | Performed by: PHYSICIAN ASSISTANT

## 2022-11-02 PROCEDURE — 88305 TISSUE EXAM BY PATHOLOGIST: CPT | Performed by: DERMATOLOGY

## 2022-11-02 PROCEDURE — 11103 TANGNTL BX SKIN EA SEP/ADDL: CPT | Performed by: PHYSICIAN ASSISTANT

## 2022-11-02 PROCEDURE — 11102 TANGNTL BX SKIN SINGLE LES: CPT | Performed by: PHYSICIAN ASSISTANT

## 2022-11-02 NOTE — PATIENT INSTRUCTIONS
WOUND CARE INSTRUCTIONS  FOR CRYOSURGERY  For questions please call [phone]        This area treated with liquid nitrogen will form a blister. You do not need to bandage the area until after the blister forms and breaks (which may be a few days).  When the blister breaks, begin daily dressing changes as follows:    1) Clean and dry the area with tap water using clean Q-tip or sterile gauze pad.    2) Apply Vaseline or Aquaphor over entire wound. Other options include Polysporin ointment or Bacitracin ointment over entire wound.  Do NOT use Neosporin ointment.    3) Cover the wound with a band-aid or sterile non-stick gauze pad and micropore paper tape.      REPEAT THESE INSTRUCTIONS AT LEAST ONCE A DAY UNTIL THE WOUND HAS COMPLETELY HEALED.        It is an old wives tale that a wound heals better when it is exposed to air and allowed to dry out. The wound will heal faster with a better cosmetic result if it is kept moist with ointment and covered with a bandage.  Do not let the wound dry out.      Supplies Needed:     *Cotton tipped applicators (Q-tips)   *Polysporin ointment or Bacitracin ointment (NOT NEOSPORIN)   *Band-aids, or non stick gauze pads and micropore paper tape    PATIENT INFORMATION    During the healing process you will notice a number of changes. All wounds develop a small halo of redness surrounding the wound.  This means healing is occurring. Severe itching with extensive redness usually indicates sensitivity to the ointment or bandage tape used to dress the wound.  You should call our office if this develops.      Swelling and/or discoloration around your surgical site is common, particularly when performed around the eye.    All wounds normally drain.  The larger the wound the more drainage there will be.  After 7-10 days, you will notice the wound beginning to shrink and new skin will begin to grow.  The wound is healed when you can see skin has formed over the entire area.  A healed  wound has a healthy, shiny look to the surface and is red to dark pink in color to normalize.  Wounds may take approximately 4-6 weeks to heal.  Larger wounds may take 6-8 weeks.  After the wound is healed you may discontinue dressing changes.    You may experience a sensation of tightness as your wound heals. This is normal and will gradually subside.    Your healed wound may be sensitive to temperature changes. This sensitivity improves with time, but if you re having a lot of discomfort, try to avoid temperature extremes.    Patients frequently experience itching after their wound appears to have healed because of the continue healing under the skin.  Plain Vaseline will help relieve the itching.                   Wound Care Instructions     FOR SUPERFICIAL WOUNDS     [facility] or     [facility] [phone]          AFTER 24 HOURS YOU SHOULD REMOVE THE BANDAGE AND BEGIN DAILY DRESSING CHANGES AS FOLLOWS:     1) Remove Dressing.     2) Clean and dry the area with tap water using a Q-tip or sterile gauze pad.     3) Apply Vaseline, Aquaphor, Polysporin ointment or Bacitracin ointment over entire wound.  Do NOT use Neosporin ointment.     4) Cover the wound with a band-aid, or a sterile non-stick gauze pad and micropore paper tape      REPEAT THESE INSTRUCTIONS AT LEAST ONCE A DAY UNTIL THE WOUND HAS COMPLETELY HEALED.    It is an old wives tale that a wound heals better when it is exposed to air and allowed to dry out. The wound will heal faster with a better cosmetic result if it is kept moist with ointment and covered with a bandage.    **Do not let the wound dry out.**      Supplies Needed:      *Cotton tipped applicators (Q-tips)    *Polysporin Ointment or Bacitracin Ointment (NOT NEOSPORIN)    *Band-aids or non-stick gauze pads and micropore paper tape.      PATIENT INFORMATION:    During the healing process you will notice a number of changes. All wounds develop a small halo of  redness surrounding the wound.  This means healing is occurring. Severe itching with extensive redness usually indicates sensitivity to the ointment or bandage tape used to dress the wound.  You should call our office if this develops.      Swelling  and/or discoloration around your surgical site is common, particularly when performed around the eye.    All wounds normally drain.  The larger the wound the more drainage there will be.  After 7-10 days, you will notice the wound beginning to shrink and new skin will begin to grow.  The wound is healed when you can see skin has formed over the entire area.  A healed wound has a healthy, shiny look to the surface and is red to dark pink in color to normalize.  Wounds may take approximately 4-6 weeks to heal.  Larger wounds may take 6-8 weeks.  After the wound is healed you may discontinue dressing changes.    You may experience a sensation of tightness as your wound heals. This is normal and will gradually subside.    Your healed wound may be sensitive to temperature changes. This sensitivity improves with time, but if you re having a lot of discomfort, try to avoid temperature extremes.    Patients frequently experience itching after their wound appears to have healed because of the continue healing under the skin.  Plain Vaseline will help relieve the itching.        POSSIBLE COMPLICATIONS    BLEEDING:    Leave the bandage in place.  Use tightly rolled up gauze or a cloth to apply direct pressure over the bandage for 30  minutes.  Reapply pressure for an additional 30 minutes if necessary  Use additional gauze and tape to maintain pressure once the bleeding has stopped.

## 2022-11-02 NOTE — LETTER
2022         RE: Kaley Hernandez  [address]  [place] [address]        Dear Colleague,    Thank you for referring your patient, Kaley Hernandez, to the [facility]. Please see a copy of my visit note below.    Kaley Hernandez is an extremely pleasant 57 year old year old female patient here today for spots on face. She notes new spot on nose. Present within past 1-2 years. She also notes mole on upper lip becoming more raised. No pain. atient has no other skin complaints today.  Remainder of the HPI, Meds, PMH, Allergies, FH, and SH was reviewed in chart.    Pertinent Hx:  No personal history of skin cancer.   Past Medical History:   Diagnosis Date     Anxiety      Benign neoplasm of colon     Created by [facility] Annotation: Mar 26 2007  1:03PM - Vivian Alvarado: colonscopy Q 5  years      Depression      Hypercholesteremia        Past Surgical History:   Procedure Laterality Date     APPENDECTOMY  spring 2011     BIOPSY BREAST Left 2010    BENIGN      SECTION  1991     HEMORRHOIDECTOMY INTERNAL LIGATION      Description: Hemorrhoidectomy;  Recorded: 2008;     ZZC  DELIVERY ONLY      Description:  Section;  Recorded: 2008;        Family History   Problem Relation Age of Onset     Myelodysplastic syndrome Father      Skin Cancer Mother      Diabetes Mother      Breast Cancer Mother      Hypertension Sister      Hyperlipidemia Sister      Colon Cancer Sister 32.00         6 months later     Hypertension Brother      Hyperlipidemia Brother        Social History     Socioeconomic History     Marital status: Single     Spouse name: Not on file     Number of children: Not on file     Years of education: Not on file     Highest education level: Not on file   Occupational History     Not on file   Tobacco Use     Smoking status: Never     Smokeless tobacco: Never   Substance and Sexual Activity     Alcohol use: Yes      Alcohol/week: 1.7 standard drinks     Comment: Alcoholic Drinks/day: occasional     Drug use: No     Sexual activity: Yes     Partners: Male     Birth control/protection: I.U.D.   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 Drinks 1-3 alcoholic beverages a week.  Daughters nearby- young grandson that she gets to see regularily.  Leatha Bernal MD       Social Determinants of Health     Financial Resource Strain: Not on file   Food Insecurity: Not on file   Transportation Needs: Not on file   Physical Activity: Not on file   Stress: Not on file   Social Connections: Not on file   Intimate Partner Violence: Not on file   Housing Stability: Not on file       Outpatient Encounter Medications as of 11/2/2022   Medication Sig Dispense Refill     buPROPion (WELLBUTRIN XL) 300 MG 24 hr tablet TAKE 1 TABLET BY MOUTH EVERY DAY IN THE MORNING 30 tablet 5     cholecalciferol, vitamin D3, 1,000 unit tablet [CHOLECALCIFEROL, VITAMIN D3, 1,000 UNIT TABLET] Take 1,000 Units by mouth daily.       citalopram (CELEXA) 20 MG tablet TAKE 1 TABLET BY MOUTH EVERY DAY 30 tablet 5     multivitamin capsule [MULTIVITAMIN CAPSULE] Take 1 capsule by mouth daily.       naltrexone (DEPADE/REVIA) 50 MG tablet Take 1/4 tablet (12.5 mg) daily with food for 1 week, then increase to 1/4 tablet (12.5 mg) two times a day. 30 tablet 1     No facility-administered encounter medications on file as of 11/2/2022.             O:   NAD, WDWN, Alert & Oriented, Mood & Affect wnl, Vitals stable   Here today alone   There were no vitals taken for this visit.   General appearance normal   Vitals stable   Alert, oriented and in no acute distress    Light brown papules on chin  0.3 cm skin colored papule on right upper cutaneous lip   0.2 cm skin colored papule on left nasal sidewall     Eyes: Conjunctivae/lids:Normal     ENT: Lips  normal    MSK:Normal    Pulm: Breathing Normal    Neuro/Psych: Orientation:Alert and Orientedx3 ; Mood/Affect:normal   A/P:  1. R/O intradermal nevus on left nasal sidewall and right upper cutaneous lip   TANGENTIAL BIOPSY SENT OUT:  After consent, anesthesia with LEC and prep, tangential excision performed and specimen sent out for permanent section histology.  No complications and routine wound care. Patient told to call our office in 1-2 weeks for result.      2. Benign nevi   It was a pleasure speaking to Kaley Hernandez today.  BENIGN LESIONS DISCUSSED WITH PATIENT:  I discussed the specifics of tumor, prognosis, and genetics of benign lesions.  I explained that treatment of these lesions would be purely cosmetic and not medically neccessary.  I discussed with patient different removal options including excision, cautery and /or laser.      Nature and genetics of benign skin lesions dicussed with patient.  Signs and Symptoms of skin cancer discussed with patient.  ABCDEs of melanoma reviewed with patient.  Patient encouraged to perform monthly skin exams.  UV precautions reviewed with patient.   Risks of non-melanoma skin cancer discussed with patient   Return to clinic pending biopsy results.       Again, thank you for allowing me to participate in the care of your patient.        Sincerely,        Zee Rivera PA-C

## 2022-11-03 LAB
PATH REPORT.COMMENTS IMP SPEC: NORMAL
PATH REPORT.FINAL DX SPEC: NORMAL
PATH REPORT.GROSS SPEC: NORMAL
PATH REPORT.MICROSCOPIC SPEC OTHER STN: NORMAL
PATH REPORT.RELEVANT HX SPEC: NORMAL

## 2022-11-04 NOTE — PROGRESS NOTES
Kaley Hernandez is an extremely pleasant 57 year old year old female patient here today for spots on face. She notes new spot on nose. Present within past 1-2 years. She also notes mole on upper lip becoming more raised. No pain. godwin has no other skin complaints today.  Remainder of the HPI, Meds, PMH, Allergies, FH, and SH was reviewed in chart.    Pertinent Hx:  No personal history of skin cancer.   Past Medical History:   Diagnosis Date     Anxiety      Benign neoplasm of colon     Created by [facility] Annotation: Mar 26 2007  1:03PM - Vivian Alvarado: colonscopy Q 5  years      Depression      Hypercholesteremia        Past Surgical History:   Procedure Laterality Date     APPENDECTOMY  spring 2011     BIOPSY BREAST Left 2010    BENIGN      SECTION  1991     HEMORRHOIDECTOMY INTERNAL LIGATION      Description: Hemorrhoidectomy;  Recorded: 2008;     ZZC  DELIVERY ONLY      Description:  Section;  Recorded: 2008;        Family History   Problem Relation Age of Onset     Myelodysplastic syndrome Father      Skin Cancer Mother      Diabetes Mother      Breast Cancer Mother      Hypertension Sister      Hyperlipidemia Sister      Colon Cancer Sister 32.00         6 months later     Hypertension Brother      Hyperlipidemia Brother        Social History     Socioeconomic History     Marital status: Single     Spouse name: Not on file     Number of children: Not on file     Years of education: Not on file     Highest education level: Not on file   Occupational History     Not on file   Tobacco Use     Smoking status: Never     Smokeless tobacco: Never   Substance and Sexual Activity     Alcohol use: Yes     Alcohol/week: 1.7 standard drinks     Comment: Alcoholic Drinks/day: occasional     Drug use: No     Sexual activity: Yes     Partners: Male     Birth control/protection: I.U.D.   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 Drinks 1-3 alcoholic beverages a week.  Daughters nearby- young grandson that she gets to see regularily.  Leatha Bernal MD       Social Determinants of Health     Financial Resource Strain: Not on file   Food Insecurity: Not on file   Transportation Needs: Not on file   Physical Activity: Not on file   Stress: Not on file   Social Connections: Not on file   Intimate Partner Violence: Not on file   Housing Stability: Not on file       Outpatient Encounter Medications as of 11/2/2022   Medication Sig Dispense Refill     buPROPion (WELLBUTRIN XL) 300 MG 24 hr tablet TAKE 1 TABLET BY MOUTH EVERY DAY IN THE MORNING 30 tablet 5     cholecalciferol, vitamin D3, 1,000 unit tablet [CHOLECALCIFEROL, VITAMIN D3, 1,000 UNIT TABLET] Take 1,000 Units by mouth daily.       citalopram (CELEXA) 20 MG tablet TAKE 1 TABLET BY MOUTH EVERY DAY 30 tablet 5     multivitamin capsule [MULTIVITAMIN CAPSULE] Take 1 capsule by mouth daily.       naltrexone (DEPADE/REVIA) 50 MG tablet Take 1/4 tablet (12.5 mg) daily with food for 1 week, then increase to 1/4 tablet (12.5 mg) two times a day. 30 tablet 1     No facility-administered encounter medications on file as of 11/2/2022.             O:   NAD, WDWN, Alert & Oriented, Mood & Affect wnl, Vitals stable   Here today alone   There were no vitals taken for this visit.   General appearance normal   Vitals stable   Alert, oriented and in no acute distress    Light brown papules on chin  0.3 cm skin colored papule on right upper cutaneous lip   0.2 cm skin colored papule on left nasal sidewall     Eyes: Conjunctivae/lids:Normal     ENT: Lips normal    MSK:Normal    Pulm: Breathing Normal    Neuro/Psych: Orientation:Alert and Orientedx3 ; Mood/Affect:normal   A/P:  1. R/O intradermal nevus on left nasal sidewall and right upper cutaneous lip   TANGENTIAL BIOPSY SENT OUT:  After consent, anesthesia with LEC and  prep, tangential excision performed and specimen sent out for permanent section histology.  No complications and routine wound care. Patient told to call our office in 1-2 weeks for result.      2. Benign nevi   It was a pleasure speaking to Kaley Hernandez today.  BENIGN LESIONS DISCUSSED WITH PATIENT:  I discussed the specifics of tumor, prognosis, and genetics of benign lesions.  I explained that treatment of these lesions would be purely cosmetic and not medically neccessary.  I discussed with patient different removal options including excision, cautery and /or laser.      Nature and genetics of benign skin lesions dicussed with patient.  Signs and Symptoms of skin cancer discussed with patient.  ABCDEs of melanoma reviewed with patient.  Patient encouraged to perform monthly skin exams.  UV precautions reviewed with patient.   Risks of non-melanoma skin cancer discussed with patient   Return to clinic pending biopsy results.

## 2022-12-15 ENCOUNTER — OFFICE VISIT (OUTPATIENT)
Dept: AUDIOLOGY | Facility: CLINIC | Age: 57
End: 2022-12-15
Attending: FAMILY MEDICINE
Payer: COMMERCIAL

## 2022-12-15 DIAGNOSIS — H91.91 DECREASED HEARING OF RIGHT EAR: ICD-10-CM

## 2022-12-15 DIAGNOSIS — H93.11 TINNITUS OF RIGHT EAR: ICD-10-CM

## 2022-12-15 DIAGNOSIS — H93.8X1 SENSATION OF FULLNESS IN RIGHT EAR: ICD-10-CM

## 2022-12-15 DIAGNOSIS — H90.A21 SENSORINEURAL HEARING LOSS (SNHL) OF RIGHT EAR WITH RESTRICTED HEARING OF LEFT EAR: Primary | ICD-10-CM

## 2022-12-15 PROCEDURE — 92550 TYMPANOMETRY & REFLEX THRESH: CPT | Performed by: AUDIOLOGIST

## 2022-12-15 PROCEDURE — 92557 COMPREHENSIVE HEARING TEST: CPT | Performed by: AUDIOLOGIST

## 2022-12-15 NOTE — PROGRESS NOTES
AUDIOLOGY REPORT    SUBJECTIVE:  Kaley Hernandez is a 57 year old female who was seen in the Audiology Clinic at the [facility] for audiologic evaluation, referred by Leatha Bernal M.D. The patient reports very muffled, decreased hearing in the right ear, with right non pulsatile tinnitus and aural fullness. Onset of symptoms began about a year ago, and were reportedly gradual. She was seen by an outside ENT in February 2021 for vertigo, and hearing loss was noted in the right ear at that time, but was not significant enough for treatment, per patient. She feels the hearing loss in the right ear has increased/worsened since then. The patient denied otalgia, otorrhea, recent illness or illness at onset of decrease hearing symptoms, falls/head injury, or noise exposure.  She was accompanied today by her .    OBJECTIVE:  Abuse Screening:  Do you feel unsafe at home or work/school? No  Do you feel threatened by someone? No  Does anyone try to keep you from having contact with others, or doing things outside of your home? No  Physical signs of abuse present? No     Fall Risk Screen:  1. Have you fallen two or more times in the past year? No  2. Have you fallen and had an injury in the past year? No    Timed Up and Go Score (in seconds): not tested  Is patient a fall risk? No  Referral initiated: No  Fall Risk Assessment Completed by Audiology    Otoscopic exam indicates ears are clear of cerumen bilaterally.     Pure Tone Thresholds assessed using conventional audiometry with good  reliability from 250-8000 Hz bilaterally using insert earphones and circumaural headphones.     RIGHT:  borderline-normal sloping to profound sensorineural hearing loss    LEFT:    normal/borderline normal hearing sensitivity for 250-1500 Hz and for 9996-3934 Hz; mild sensorineural hearing loss for 2000 Hz only.    Tympanogram:    RIGHT: normal eardrum mobility    LEFT:   normal eardrum  mobility    Reflexes for 1000 Hz (reported by stimulus ear):  RIGHT: Ipsilateral is present at normal levels  RIGHT: Contralateral is present at normal levels  LEFT:   Ipsilateral is present at normal levels  LEFT:   Contralateral is present at normal levels      Speech Reception Threshold:    RIGHT: 40 dB HL    LEFT:   15 dB HL  Word Recognition Score:     RIGHT: 84% at 85 dB HL using NU-6 recorded word list.    LEFT:   100% at 55 dB HL using NU-6 recorded word list.      ASSESSMENT:     ICD-10-CM    1. Sensorineural hearing loss (SNHL) of right ear with restricted hearing of left ear  H90.A21       2. Decreased hearing of right ear  H91.91 Adult Audiology  Referral      3. Tinnitus of right ear  H93.11       4. Sensation of fullness in right ear  H93.8X1           Today s results were discussed with the patient and her  in detail.     PLAN:  Patient was counseled regarding hearing loss and impact on communication. Due to the marked asymmetry in hearing ability between ears, ENT consult is strongly recommended. Patient was given contact information to schedule ENT appointment as soon as possible, and it was suggested that she obtain her previous ENT records to hand-carry to that appointment. Retest hearing annually to monitor, or per medical management/patient concern. Wear hearing protection consistently in noise to preserve residual hearing sensitivity and to minimize the effects of tinnitus. Pending medical clearance, Ms. Hernandez is an amplification candidate for the right ear. She expressed verbal understanding of this information and plan.  Please call this clinic with questions regarding these results or recommendations.        Rodo Grimes, [facility]  [place] Licensed Audiologist [id number]

## 2023-09-11 ENCOUNTER — TELEPHONE (OUTPATIENT)
Dept: FAMILY MEDICINE | Facility: CLINIC | Age: 58
End: 2023-09-11

## 2023-09-11 ENCOUNTER — MYC MEDICAL ADVICE (OUTPATIENT)
Dept: FAMILY MEDICINE | Facility: CLINIC | Age: 58
End: 2023-09-11
Payer: COMMERCIAL

## 2023-09-11 DIAGNOSIS — F41.9 ANXIETY: ICD-10-CM

## 2023-09-11 DIAGNOSIS — F33.1 MODERATE EPISODE OF RECURRENT MAJOR DEPRESSIVE DISORDER (H): ICD-10-CM

## 2023-09-11 NOTE — TELEPHONE ENCOUNTER
9-11-23    Reason for Call:  Appointment Request    Patient requesting this type of appt:  office visit     Requested provider: Leatha Bernal    Reason patient unable to be scheduled: Not within requested timeframe    When does patient want to be seen/preferred time: 1-2 weeks    Comments: pt called stated she has an appt scheduled on 1-3 and is requesting a sooner availability.  Pt wanted a med check & has concerns on hormones.  Pt wouldn't elaborate on what her concerns are or why she's requesting a sooner appt    Could we send this information to you in [employer] or would you prefer to receive a phone call?:   Patient would prefer a phone call   Okay to leave a detailed message?: Yes at Cell number on file:    Telephone Information:   Mobile [phone]       Call taken on 9/11/2023 at 8:38 AM by Ruth Beckman

## 2023-09-13 RX ORDER — CITALOPRAM HYDROBROMIDE 20 MG/1
20 TABLET ORAL DAILY
Qty: 90 TABLET | Refills: 1 | Status: SHIPPED | OUTPATIENT
Start: 2023-09-13 | End: 2023-10-13

## 2023-09-19 NOTE — TELEPHONE ENCOUNTER
Can she come at 10:45am on Monday Oct 2 for a double book? I am very limited due to OB rounding this next week

## 2023-10-13 ENCOUNTER — TELEPHONE (OUTPATIENT)
Dept: FAMILY MEDICINE | Facility: CLINIC | Age: 58
End: 2023-10-13
Payer: COMMERCIAL

## 2023-10-13 ENCOUNTER — OFFICE VISIT (OUTPATIENT)
Dept: PHARMACY | Facility: CLINIC | Age: 58
End: 2023-10-13
Payer: COMMERCIAL

## 2023-10-13 VITALS
DIASTOLIC BLOOD PRESSURE: 70 MMHG | SYSTOLIC BLOOD PRESSURE: 126 MMHG | BODY MASS INDEX: 29.43 KG/M2 | WEIGHT: 205.1 LBS | HEART RATE: 66 BPM

## 2023-10-13 DIAGNOSIS — Z78.9 TAKES DIETARY SUPPLEMENTS: ICD-10-CM

## 2023-10-13 DIAGNOSIS — F33.9 EPISODE OF RECURRENT MAJOR DEPRESSIVE DISORDER, UNSPECIFIED DEPRESSION EPISODE SEVERITY (H): ICD-10-CM

## 2023-10-13 DIAGNOSIS — F33.1 MODERATE EPISODE OF RECURRENT MAJOR DEPRESSIVE DISORDER (H): ICD-10-CM

## 2023-10-13 DIAGNOSIS — E66.09 OBESITY DUE TO EXCESS CALORIES, UNSPECIFIED CLASSIFICATION, UNSPECIFIED WHETHER SERIOUS COMORBIDITY PRESENT: Primary | ICD-10-CM

## 2023-10-13 DIAGNOSIS — F41.9 ANXIETY: ICD-10-CM

## 2023-10-13 PROCEDURE — 99607 MTMS BY PHARM ADDL 15 MIN: CPT | Performed by: PHARMACIST

## 2023-10-13 PROCEDURE — 99605 MTMS BY PHARM NP 15 MIN: CPT | Performed by: PHARMACIST

## 2023-10-13 RX ORDER — TIRZEPATIDE 2.5 MG/.5ML
2.5 INJECTION, SOLUTION SUBCUTANEOUS WEEKLY
Qty: 2 ML | Refills: 0 | Status: SHIPPED | OUTPATIENT
Start: 2023-10-13 | End: 2023-11-10

## 2023-10-13 RX ORDER — CITALOPRAM HYDROBROMIDE 20 MG/1
30 TABLET ORAL DAILY
Qty: 135 TABLET | Refills: 1 | Status: SHIPPED | OUTPATIENT
Start: 2023-10-13 | End: 2024-06-07

## 2023-10-13 RX ORDER — TIRZEPATIDE 5 MG/.5ML
5 INJECTION, SOLUTION SUBCUTANEOUS WEEKLY
Qty: 2 ML | Refills: 1 | Status: SHIPPED | OUTPATIENT
Start: 2023-10-13 | End: 2024-02-13

## 2023-10-13 NOTE — TELEPHONE ENCOUNTER
"[facility] PA REQUEST    Please route outcome to [facility]: Jaquelin Mcelroy, Darnell    Prior Authorization Retail Medication Request    Medication/Dose: Mounjaro 2.5 mg injection  ICD code (if different than what is on RX):    Previously Tried and Failed:  phentermine, naltrexone, bupropion  Rationale:  BMI >27 kg/m2 and comorbidity - hyperlipidemia  Estimated body mass index is 29.43 kg/m  as calculated from the following:    Height as of 2/9/21: 5' 10\" (1.778 m).    Weight as of an earlier encounter on 10/13/23: 205 lb 1.6 oz (93 kg).    Insurance Name:  [facility] Individual and family plan  Insurance ID:  [id number]       Pharmacy Information (if different than what is on RX)  Name:    Phone:        "

## 2023-10-13 NOTE — PROGRESS NOTES
Discuss at upcoming visit  Medication Therapy Management (MTM) Encounter    ASSESSMENT:                            1. Overweight  Patient is interested in injectable weight loss medication given BMI >27 kg/m2 and comorbid condition of hyperlipidemia and strong family history of diabetes. Oral medication options have been ineffective. Unfortunately insurance does not cover GLP-1 agonists for weight loss and other GLP-1 agonists only approved for type 2 diabetes. Patient is interested in paying out of pocket with savings card. Will start Mounjaro given superior efficacy. Medication education and counseling was provided, including indication, expected effect, dosing instructions, and possible side effects. Reviewed injection technique with use of demo pen. The patient's questions were answered. Encouraged continued lifestyle modification with healthy eating and exercise.     2. Depression and anxiety  Not optimally controlled. Discussed option to increase citalopram back to 30 mg daily or add back bupropion. Did not feel bupropion was particularly helpful, so will try dose increase in citalopram.     3. Takes dietary supplements  Reviewed current supplement use with patient. Reasonable to continue.     PLAN:                            Increase citalopram to 1-1/2 tablets (30 mg) daily.     Start Mounjaro. Inject 2.5 mg once weekly. After 4 weeks, increase to 5 mg injected weekly.     Here is a video reviewing injection instructions for your reference:  [email or web address]      Follow-up: Return in about 8 weeks (around 12/8/2023).    SUBJECTIVE/OBJECTIVE:                          Kaley Hernandez is a 58 year old female coming in for a follow up visit. She was referred to me from Leatha Mason.  This is a follow up from 10/4/22.     Reason for visit: weight loss medication.    Allergies/ADRs: Reviewed in chart  Past Medical History: Reviewed in chart  Tobacco: She reports that she has never smoked. She has never  "used smokeless tobacco.  Alcohol: Reviewed in chart    Medication Adherence/Access: no issues reported    1. Overweight  Interested in medication to help with weight loss. Was on phentermine previously as well as bupropion + naltrexone, but ineffective. Goal weight is around 170 lbs. States there is a family history of diabetes. Does have high cholesterol. Her sisters are on Mounjaro or Ozempic and have seen successful weight loss. Endorses emotional eating, especially in the evening when cravings are the highest. Lots of sugar cravings. Has met with dietician.      Wt Readings from Last 4 Encounters:   10/13/23 205 lb 1.6 oz (93 kg)   08/16/22 192 lb 14.4 oz (87.5 kg)   12/08/21 195 lb (88.5 kg)   03/26/20 196 lb 9.6 oz (89.2 kg)     Estimated body mass index is 29.43 kg/m  as calculated from the following:    Height as of 2/9/21: 5' 10\" (1.778 m).    Weight as of this encounter: 205 lb 1.6 oz (93 kg).    Hemoglobin A1C   Date Value Ref Range Status   06/22/2021 5.3 <=5.6 % Final     The 10-year ASCVD risk score (Angelia VIDAL, et al., 2019) is: 3.3%    Values used to calculate the score:      Age: 58 years      Sex: Female      Is Non- : No      Diabetic: No      Tobacco smoker: No      Systolic Blood Pressure: 126 mmHg      Is BP treated: No      HDL Cholesterol: 56 mg/dL      Total Cholesterol: 265 mg/dL     Recent Labs   Lab Test 06/22/21  1544 03/26/20  1423   CHOL 265* 281*   HDL 56 62   * 206*   TRIG 94 66     2. Depression and anxiety  Kaley is taking citalopram 20 mg daily. No longer on bupropion and didn't notice any change after stopping. She did run out of citalopram and tried off of it for a couple weeks. Definitely noticed a difference off the medication. Less anxiety now since she resumed. Was on 30 mg but dose was lowered when she started bupropion and naltrexone for weight loss.   Has tried Prozac and thinks it worked, but made her \"numb\" and flat. Has also been on " venlafaxine and escitalopram.         6/22/2021     3:00 PM 12/8/2021    11:59 AM 8/16/2022     3:00 PM   PHQ   PHQ-9 Total Score 7 4 14   Q9: Thoughts of better off dead/self-harm past 2 weeks Not at all Not at all Not at all         4/13/2021     3:00 PM 6/22/2021     3:00 PM 12/8/2021    11:59 AM   NEENA-7 SCORE   Total Score 10 6 9       3. Takes dietary supplements  Kaley is taking vitamin D 1000 units daily and multivitamin daily for general health.        Vitals:   BP Readings from Last 3 Encounters:   10/13/23 126/70   08/16/22 120/86   12/08/21 126/84      Pulse Readings from Last 3 Encounters:   10/13/23 66   08/16/22 68   12/08/21 69     Wt Readings from Last 3 Encounters:   10/13/23 205 lb 1.6 oz (93 kg)   08/16/22 192 lb 14.4 oz (87.5 kg)   12/08/21 195 lb (88.5 kg)     ----------------    I spent 30 minutes with this patient today. All changes were made via collaborative practice agreement with Leatha Bernal MD. A copy of the visit note was provided to the patient's provider(s).    The patient was given a summary of these recommendations.     Jaquelin Mcelroy, PharmD, BCACP  Medication Management (MTM) Pharmacist  [facility]        Medication Therapy Recommendations  Major depressive disorder, recurrent episode (H24)    Current Medication: citalopram (CELEXA) 20 MG tablet   Rationale: Dose too low - Dosage too low - Effectiveness   Recommendation: Increase Dose   Status: Accepted per CPA         Obesity, unspecified    Current Medication: tirzepatide (MOUNJARO) 2.5 MG/0.5ML pen   Rationale: Untreated condition - Needs additional medication therapy - Indication   Recommendation: Start Medication   Status: Accepted per CPA

## 2023-10-13 NOTE — PATIENT INSTRUCTIONS
"Recommendations from today's Medication Management (MTM) visit:                                                      Increase citalopram to 1-1/2 tablets (30 mg) daily.     Start Mounjaro. Inject 2.5 mg once weekly. After 4 weeks, increase to 5 mg injected weekly.     Here is a video reviewing injection instructions for your reference:  [email or web address]        To schedule another MTM appointment, please call the MTM scheduling line at [phone] or toll-free at [phone].     My MTM pharmacist's contact information:      Please feel free to contact me with any questions or concerns you have via [employer] or calling the clinic.       Jaquelin Mcelroy, PharmD, [facility]  Medication Management (MTM) Pharmacist  [facility]     It was great speaking with you today.  I value your experience and would be very thankful for your time in providing feedback in our clinic survey. In the next few days, you may receive an email or text message from [employer] with a link to a survey related to your  clinical pharmacist.\"     "

## 2023-10-14 ENCOUNTER — HEALTH MAINTENANCE LETTER (OUTPATIENT)
Age: 58
End: 2023-10-14

## 2023-10-18 NOTE — TELEPHONE ENCOUNTER
Central Prior Authorization Team  Phone: [phone]    PA Initiation    Medication: MOUNJARO 2.5 MG/0.5ML SC SOPN  Insurance Company: [employer] Non-Specialty PA's - Phone [phone] Fax [phone]  Pharmacy Filling the Rx: [id number] [place], [place] - [address] [place] [address]  Filling Pharmacy Phone: [phone]  Filling Pharmacy Fax:    Start Date: 10/18/2023

## 2023-10-19 NOTE — TELEPHONE ENCOUNTER
Central Prior Authorization Team  Phone: [phone]    PRIOR AUTHORIZATION DENIED    Medication: MOUNJARO 2.5 MG/0.5ML SC SOPN  Insurance Company: [employer] Non-Specialty PA's - Phone [phone] Fax [phone]  Denial Date: 10/18/2023  Denial Rational:         Appeal Information:

## 2023-10-20 NOTE — TELEPHONE ENCOUNTER
Patient notified via [employer]. Willing to pay out of pocket for the medication.     Jaquelin Mcelroy, PharmD, BCACP  Medication Management (MTM) Pharmacist  [facility]

## 2023-11-06 DIAGNOSIS — E66.09 OBESITY DUE TO EXCESS CALORIES, UNSPECIFIED CLASSIFICATION, UNSPECIFIED WHETHER SERIOUS COMORBIDITY PRESENT: ICD-10-CM

## 2023-11-10 RX ORDER — TIRZEPATIDE 2.5 MG/.5ML
5 INJECTION, SOLUTION SUBCUTANEOUS WEEKLY
Qty: 12 ML | Refills: 0 | Status: SHIPPED | OUTPATIENT
Start: 2023-11-10 | End: 2023-12-22 | Stop reason: DRUGHIGH

## 2023-12-08 ENCOUNTER — E-VISIT (OUTPATIENT)
Dept: OBGYN | Facility: CLINIC | Age: 58
End: 2023-12-08
Payer: COMMERCIAL

## 2023-12-08 DIAGNOSIS — N39.0 ACUTE UTI (URINARY TRACT INFECTION): Primary | ICD-10-CM

## 2023-12-08 PROCEDURE — 99421 OL DIG E/M SVC 5-10 MIN: CPT | Performed by: FAMILY MEDICINE

## 2023-12-08 RX ORDER — NITROFURANTOIN 25; 75 MG/1; MG/1
100 CAPSULE ORAL 2 TIMES DAILY
Qty: 10 CAPSULE | Refills: 0 | Status: SHIPPED | OUTPATIENT
Start: 2023-12-08 | End: 2023-12-13

## 2023-12-08 NOTE — PATIENT INSTRUCTIONS
Dear Kaley Hernandez    After reviewing your responses, I've been able to diagnose you with a urinary tract infection, which is a common infection of the bladder with bacteria.  This is not a sexually transmitted infection, though urinating immediately after intercourse can help prevent infections.  Drinking lots of fluids is also helpful to clear your current infection and prevent the next one.      I have sent a prescription for antibiotics to your pharmacy to treat this infection.    It is important that you take all of your prescribed medication even if your symptoms are improving after a few doses.  Taking all of your medicine helps prevent the symptoms from returning.     If your symptoms worsen, you develop pain in your back or stomach, develop fevers, or are not improving in 5 days, please contact your primary care provider for an appointment or visit any of our convenient Walk-in or Urgent Care Centers to be seen, which can be found on our website here.    Thanks again for choosing us as your health care partner,    Leatha Bernal MD

## 2023-12-21 NOTE — PROGRESS NOTES
Medication Therapy Management (MTM) Encounter    ASSESSMENT:                            1. Overweight  Now on injectable weight loss medication given BMI >27 kg/m2 and comorbid condition of hyperlipidemia and strong family history of diabetes. Oral medication options have been ineffective. Unfortunately insurance does not cover GLP-1 agonists for weight loss so paying out of pocket with savings card. Encouraged continued lifestyle modification with healthy eating and exercise. For additional effect since tolerating well, will continue with dose titration and increase to 7.5 mg weekly.     2. Depression and anxiety  Some noted improvement after dose increase in citalopram. Will send PHQ-9 and NEENA-7 questionnaires via [employer] for updated scores.     3. Takes dietary supplements  Have reviewed current supplement use with patient. Reasonable to continue.     PLAN:                            Increase Mounjaro to 7.5 mg injected weekly.       Follow-up: Return in about 2 months (around 2/22/2024).    SUBJECTIVE/OBJECTIVE:                          Kaley Hernandez is a 58 year old female called for a follow up visit. She was referred to me from Leatha Mason. This is a follow up from 10/13/23.    Reason for visit: weight loss medication, depression follow up.    Allergies/ADRs: Reviewed in chart  Past Medical History: Reviewed in chart  Tobacco: She reports that she has never smoked. She has never used smokeless tobacco.  Alcohol: Reviewed in chart    Medication Adherence/Access: no issues reported    1. Overweight  Mounjaro 5 mg injected weekly    Mounjaro started at last visit to assist with weight loss. Has lost some weight, about 15 lbs so far. Goal weight is around 170 lbs. Less hunger and cravings. Initially had some indigestion/diarrhea, but resolved and now denies any side effects. Tends to have constipation, but no worsening since starting medication.   Was on phentermine previously as well as  "bupropion + naltrexone, but ineffective. States there is a family history of diabetes. Does have high cholesterol. Has met with dietician.      Wt Readings from Last 4 Encounters:   10/13/23 205 lb 1.6 oz (93 kg)   08/16/22 192 lb 14.4 oz (87.5 kg)   12/08/21 195 lb (88.5 kg)   03/26/20 196 lb 9.6 oz (89.2 kg)     Estimated body mass index is 29.43 kg/m  as calculated from the following:    Height as of 2/9/21: 5' 10\" (1.778 m).    Weight as of 10/13/23: 205 lb 1.6 oz (93 kg).    Hemoglobin A1C   Date Value Ref Range Status   06/22/2021 5.3 <=5.6 % Final     The 10-year ASCVD risk score (Angelia VIDAL, et al., 2019) is: 3.3%    Values used to calculate the score:      Age: 58 years      Sex: Female      Is Non- : No      Diabetic: No      Tobacco smoker: No      Systolic Blood Pressure: 126 mmHg      Is BP treated: No      HDL Cholesterol: 56 mg/dL      Total Cholesterol: 265 mg/dL     Recent Labs   Lab Test 06/22/21  1544 03/26/20  1423   CHOL 265* 281*   HDL 56 62   * 206*   TRIG 94 66     2. Depression and anxiety  Citalopram 30 mg daily    Increased dose at last visit that she was previously on when she was on bupropion and naltrexone for weight loss, but no longer taking these. Didn't notice any change after stopping bupropion. Some improvement with citalopram dose increase, but not \"extreme.\" Overall feeling well. Feels this is a good dose for her.   She did run out of citalopram in the past and tried off of it for a couple weeks. Definitely noticed a difference off the medication. Has tried Prozac and thinks it worked, but made her \"numb\" and flat. Has also been on venlafaxine and escitalopram.         6/22/2021     3:00 PM 12/8/2021    11:59 AM 8/16/2022     3:00 PM   PHQ   PHQ-9 Total Score 7 4 14   Q9: Thoughts of better off dead/self-harm past 2 weeks Not at all Not at all Not at all         4/13/2021     3:00 PM 6/22/2021     3:00 PM 12/8/2021    11:59 AM   NEENA-7 SCORE   Total " Score 10 6 9       3. Takes dietary supplements  Vitamin D 1000 units daily   Multivitamin daily     Taking these for general health.        Vitals:   BP Readings from Last 3 Encounters:   10/13/23 126/70   08/16/22 120/86   12/08/21 126/84      Pulse Readings from Last 3 Encounters:   10/13/23 66   08/16/22 68   12/08/21 69     Wt Readings from Last 3 Encounters:   10/13/23 205 lb 1.6 oz (93 kg)   08/16/22 192 lb 14.4 oz (87.5 kg)   12/08/21 195 lb (88.5 kg)     ----------------    I spent 15 minutes with this patient today. All changes were made via collaborative practice agreement with Leatha Bernal MD. A copy of the visit note was provided to the patient's provider(s).    The patient was given a summary of these recommendations via [employer].     Jaquelin Mcelroy, PharmD, BCACP  Medication Management (MTM) Pharmacist  [facility]        Medication Therapy Recommendations  Obesity, unspecified    Current Medication: tirzepatide (MOUNJARO) 5 MG/0.5ML pen   Rationale: Dose too low - Dosage too low - Effectiveness   Recommendation: Increase Dose   Status: Accepted per [facility]                Telemedicine Visit Details  Type of service:  Telephone visit  Start Time:  10:30 AM  End Time:  10:45 AM

## 2023-12-22 ENCOUNTER — VIRTUAL VISIT (OUTPATIENT)
Dept: PHARMACY | Facility: CLINIC | Age: 58
End: 2023-12-22
Payer: COMMERCIAL

## 2023-12-22 DIAGNOSIS — Z78.9 TAKES DIETARY SUPPLEMENTS: ICD-10-CM

## 2023-12-22 DIAGNOSIS — E66.09 OBESITY DUE TO EXCESS CALORIES, UNSPECIFIED CLASSIFICATION, UNSPECIFIED WHETHER SERIOUS COMORBIDITY PRESENT: Primary | ICD-10-CM

## 2023-12-22 DIAGNOSIS — F33.9 EPISODE OF RECURRENT MAJOR DEPRESSIVE DISORDER, UNSPECIFIED DEPRESSION EPISODE SEVERITY (H): ICD-10-CM

## 2023-12-22 PROCEDURE — 99606 MTMS BY PHARM EST 15 MIN: CPT | Performed by: PHARMACIST

## 2023-12-22 PROCEDURE — 99607 MTMS BY PHARM ADDL 15 MIN: CPT | Performed by: PHARMACIST

## 2023-12-22 RX ORDER — TIRZEPATIDE 7.5 MG/.5ML
7.5 INJECTION, SOLUTION SUBCUTANEOUS
Qty: 2 ML | Refills: 1 | Status: SHIPPED | OUTPATIENT
Start: 2023-12-22 | End: 2024-02-13 | Stop reason: DRUGHIGH

## 2023-12-22 NOTE — PATIENT INSTRUCTIONS
"Recommendations from today's Medication Management (MTM) visit:                                                        Increase Mounjaro to 7.5 mg injected weekly.       To schedule another MTM appointment, please call the MTM scheduling line at [phone] or toll-free at [phone].     My MTM pharmacist's contact information:      Please feel free to contact me with any questions or concerns you have via [employer] or calling the clinic.       Jaquelin Mcelroy, PharmD, [facility]  Medication Management (MTM) Pharmacist  [facility]     It was great speaking with you today.  I value your experience and would be very thankful for your time in providing feedback in our clinic survey. In the next few days, you may receive an email or text message from [employer] with a link to a survey related to your  clinical pharmacist.\"     "

## 2023-12-29 DIAGNOSIS — E66.09 OBESITY DUE TO EXCESS CALORIES, UNSPECIFIED CLASSIFICATION, UNSPECIFIED WHETHER SERIOUS COMORBIDITY PRESENT: ICD-10-CM

## 2023-12-29 RX ORDER — TIRZEPATIDE 5 MG/.5ML
5 INJECTION, SOLUTION SUBCUTANEOUS WEEKLY
Refills: 1 | OUTPATIENT
Start: 2023-12-29

## 2024-01-12 ENCOUNTER — LAB (OUTPATIENT)
Dept: LAB | Facility: CLINIC | Age: 59
End: 2024-01-12
Payer: COMMERCIAL

## 2024-01-12 ENCOUNTER — MYC MEDICAL ADVICE (OUTPATIENT)
Dept: FAMILY MEDICINE | Facility: CLINIC | Age: 59
End: 2024-01-12

## 2024-01-12 DIAGNOSIS — N39.0 ACUTE UTI (URINARY TRACT INFECTION): ICD-10-CM

## 2024-01-12 LAB
ALBUMIN UR-MCNC: NEGATIVE MG/DL
APPEARANCE UR: CLEAR
BACTERIA #/AREA URNS HPF: ABNORMAL /HPF
BILIRUB UR QL STRIP: NEGATIVE
COLOR UR AUTO: YELLOW
GLUCOSE UR STRIP-MCNC: NEGATIVE MG/DL
HGB UR QL STRIP: ABNORMAL
KETONES UR STRIP-MCNC: ABNORMAL MG/DL
LEUKOCYTE ESTERASE UR QL STRIP: ABNORMAL
MUCOUS THREADS #/AREA URNS LPF: PRESENT /LPF
NITRATE UR QL: POSITIVE
PH UR STRIP: 6 [PH] (ref 5–8)
RBC #/AREA URNS AUTO: ABNORMAL /HPF
SP GR UR STRIP: >=1.03 (ref 1–1.03)
SQUAMOUS #/AREA URNS AUTO: ABNORMAL /LPF
UROBILINOGEN UR STRIP-ACNC: 0.2 E.U./DL
WBC #/AREA URNS AUTO: ABNORMAL /HPF

## 2024-01-12 PROCEDURE — 87186 SC STD MICRODIL/AGAR DIL: CPT

## 2024-01-12 PROCEDURE — 87088 URINE BACTERIA CULTURE: CPT

## 2024-01-12 PROCEDURE — 81001 URINALYSIS AUTO W/SCOPE: CPT

## 2024-01-12 PROCEDURE — 87086 URINE CULTURE/COLONY COUNT: CPT

## 2024-01-13 DIAGNOSIS — N39.0 URINARY TRACT INFECTION WITHOUT HEMATURIA, SITE UNSPECIFIED: Primary | ICD-10-CM

## 2024-01-13 LAB — BACTERIA UR CULT: ABNORMAL

## 2024-01-13 RX ORDER — SULFAMETHOXAZOLE/TRIMETHOPRIM 800-160 MG
1 TABLET ORAL 2 TIMES DAILY
Qty: 10 TABLET | Refills: 0 | Status: SHIPPED | OUTPATIENT
Start: 2024-01-13 | End: 2024-01-18

## 2024-01-13 NOTE — PROGRESS NOTES
Recent UA orders reviewed; pt had not submitted evisit since 12/8 (empiric tx with Macrobid and must have utilized those pended/standing orders to have urine rechecked). Will send bactrim at this time. Mychart response sent.   Thanks,   Dr. Bernal

## 2024-02-10 NOTE — PROGRESS NOTES
Medication Therapy Management (MTM) Encounter    ASSESSMENT:                            1. Overweight  Now on injectable weight loss medication given BMI >27 kg/m2 and comorbid condition of hyperlipidemia and strong family history of diabetes. Unfortunately insurance does not cover GLP-1 agonists for weight loss so paying out of pocket with savings card. Congratulated her on the positive lifestyle modification she has made so far with healthy eating and exercise and encouraged to continue. For additional effect since tolerating well, will continue with dose titration and increase to 10 mg weekly to help reach goal weight.     2. Depression and anxiety  Adequately controlled on citalopram. PHQ-9 and NEENA-7 questionnaires sent via [employer] for updated scores. Will also be seeing PCP next month.     3. Takes dietary supplements  Have reviewed current supplement use with patient. Reasonable to continue.     PLAN:                            Increase Mounjaro to 10 mg injected weekly.       Follow-up: With PCP in 6 weeks as scheduled. Return in about 4 months (around 6/13/2024). With PharmD    SUBJECTIVE/OBJECTIVE:                          Kaley Hernandez is a 58 year old female called for a follow up visit. She was referred to me from Leatha Mason. This is a follow up from 12/22/23.    Reason for visit: weight loss medication    Allergies/ADRs: Reviewed in chart  Past Medical History: Reviewed in chart  Tobacco: She reports that she has never smoked. She has never used smokeless tobacco.  Alcohol: Reviewed in chart    Medication Adherence/Access: no issues reported    1. Overweight  Mounjaro 7.5 mg injected weekly    Mounjaro started last year and have been titrating dose. Has lost 30 lbs so far and feeling great. Current weight of 170 lbs. Would like to lose a bit more. Less hunger and cravings. Has cut out sweets from diet and decreased carbs. Initially had some indigestion/diarrhea, but resolved and now  "denies any side effects. Tends to have constipation, and has noticed some on the medication, but manageable. Uses a tea to help with bowel movements. Feels has lost some muscle tone, so focusing on changing up her exercise routine.   Was on phentermine previously as well as bupropion + naltrexone, but ineffective. States there is a family history of diabetes. Does have high cholesterol. Has met with dietician.      Wt Readings from Last 4 Encounters:   10/13/23 205 lb 1.6 oz (93 kg)   08/16/22 192 lb 14.4 oz (87.5 kg)   12/08/21 195 lb (88.5 kg)   03/26/20 196 lb 9.6 oz (89.2 kg)     Estimated body mass index is 29.43 kg/m  as calculated from the following:    Height as of 2/9/21: 5' 10\" (1.778 m).    Weight as of 10/13/23: 205 lb 1.6 oz (93 kg).    Hemoglobin A1C   Date Value Ref Range Status   06/22/2021 5.3 <=5.6 % Final     The 10-year ASCVD risk score (Angelia VIDAL, et al., 2019) is: 3.3%    Values used to calculate the score:      Age: 58 years      Sex: Female      Is Non- : No      Diabetic: No      Tobacco smoker: No      Systolic Blood Pressure: 126 mmHg      Is BP treated: No      HDL Cholesterol: 56 mg/dL      Total Cholesterol: 265 mg/dL     Recent Labs   Lab Test 06/22/21  1544 03/26/20  1423   CHOL 265* 281*   HDL 56 62   * 206*   TRIG 94 66     2. Depression and anxiety  Citalopram 30 mg daily    Increased dose last year that she was previously on when she was on bupropion and naltrexone for weight loss, but no longer taking these. Didn't notice any change in symptoms after stopping bupropion. Some improvement with citalopram dose increase, but not \"extreme.\" Overall feeling well. Feels this is a good dose for her.   She did run out of citalopram in the past and tried off of it for a couple weeks. Definitely noticed a difference off the medication. Has tried Prozac and thinks it worked, but made her \"numb\" and flat. Has also been on venlafaxine and escitalopram.         " 6/22/2021     3:00 PM 12/8/2021    11:59 AM 8/16/2022     3:00 PM   PHQ   PHQ-9 Total Score 7 4 14   Q9: Thoughts of better off dead/self-harm past 2 weeks Not at all Not at all Not at all         4/13/2021     3:00 PM 6/22/2021     3:00 PM 12/8/2021    11:59 AM   NEENA-7 SCORE   Total Score 10 6 9       3. Takes dietary supplements  Vitamin D 1000 units daily   Multivitamin daily     Taking these for general health.        Vitals:   BP Readings from Last 3 Encounters:   10/13/23 126/70   08/16/22 120/86   12/08/21 126/84      Pulse Readings from Last 3 Encounters:   10/13/23 66   08/16/22 68   12/08/21 69     Wt Readings from Last 3 Encounters:   10/13/23 205 lb 1.6 oz (93 kg)   08/16/22 192 lb 14.4 oz (87.5 kg)   12/08/21 195 lb (88.5 kg)     ----------------    I spent 15 minutes with this patient today. All changes were made via collaborative practice agreement with Leatha Bernal MD. A copy of the visit note was provided to the patient's provider(s).    The patient was given a summary of these recommendations via [email or web address].     Jaquelin Mcelroy, PharmD, BCACP  Medication Management (MTM) Pharmacist  [facility]        Medication Therapy Recommendations  Overweight (BMI 25.0-29.9)    Current Medication: tirzepatide (MOUNJARO) 7.5 MG/0.5ML pen (Discontinued)   Rationale: Dose too low - Dosage too low - Effectiveness   Recommendation: Increase Dose   Status: Accepted per [facility]               Telemedicine Visit Details  Type of service:  Telephone visit  Start Time: 2:15 PM  End Time: 2:30 PM

## 2024-02-13 ENCOUNTER — VIRTUAL VISIT (OUTPATIENT)
Dept: PHARMACY | Facility: CLINIC | Age: 59
End: 2024-02-13
Payer: COMMERCIAL

## 2024-02-13 DIAGNOSIS — E66.3 OVERWEIGHT (BMI 25.0-29.9): Primary | ICD-10-CM

## 2024-02-13 DIAGNOSIS — Z78.9 TAKES DIETARY SUPPLEMENTS: ICD-10-CM

## 2024-02-13 DIAGNOSIS — F33.9 EPISODE OF RECURRENT MAJOR DEPRESSIVE DISORDER, UNSPECIFIED DEPRESSION EPISODE SEVERITY (H): ICD-10-CM

## 2024-02-13 PROCEDURE — 99607 MTMS BY PHARM ADDL 15 MIN: CPT | Performed by: PHARMACIST

## 2024-02-13 PROCEDURE — 99605 MTMS BY PHARM NP 15 MIN: CPT | Performed by: PHARMACIST

## 2024-02-13 RX ORDER — TIRZEPATIDE 10 MG/.5ML
10 INJECTION, SOLUTION SUBCUTANEOUS
Qty: 2 ML | Refills: 3 | Status: SHIPPED | OUTPATIENT
Start: 2024-02-13 | End: 2024-03-25

## 2024-02-13 NOTE — PATIENT INSTRUCTIONS
"Recommendations from today's Medication Management (MTM) visit:                                                      Increase Mounjaro to 10 mg injected weekly.       To schedule another MTM appointment, please call the MTM scheduling line at [phone] or toll-free at [phone].     My MTM pharmacist's contact information:      Please feel free to contact me with any questions or concerns you have via [employer] or calling the clinic.       Jaquelin Mcelroy, PharmD, [facility]  Medication Management (MTM) Pharmacist  [facility]     It was great speaking with you today.  I value your experience and would be very thankful for your time in providing feedback in our clinic survey. In the next few days, you may receive an email or text message from [employer] with a link to a survey related to your  clinical pharmacist.\"     "

## 2024-02-23 DIAGNOSIS — Z71.3 WEIGHT LOSS COUNSELING, ENCOUNTER FOR: Primary | ICD-10-CM

## 2024-02-26 DIAGNOSIS — Z71.3 WEIGHT LOSS COUNSELING, ENCOUNTER FOR: ICD-10-CM

## 2024-02-26 RX ORDER — TIRZEPATIDE 7.5 MG/.5ML
INJECTION, SOLUTION SUBCUTANEOUS
Refills: 1 | OUTPATIENT
Start: 2024-02-26

## 2024-02-26 RX ORDER — TIRZEPATIDE 7.5 MG/.5ML
INJECTION, SOLUTION SUBCUTANEOUS
Qty: 3 ML | Refills: 1 | Status: SHIPPED | OUTPATIENT
Start: 2024-02-26 | End: 2024-03-25

## 2024-03-25 ENCOUNTER — OFFICE VISIT (OUTPATIENT)
Dept: FAMILY MEDICINE | Facility: CLINIC | Age: 59
End: 2024-03-25
Payer: COMMERCIAL

## 2024-03-25 ENCOUNTER — TELEPHONE (OUTPATIENT)
Dept: FAMILY MEDICINE | Facility: CLINIC | Age: 59
End: 2024-03-25

## 2024-03-25 VITALS
DIASTOLIC BLOOD PRESSURE: 66 MMHG | HEIGHT: 69 IN | BODY MASS INDEX: 22.84 KG/M2 | WEIGHT: 154.2 LBS | OXYGEN SATURATION: 99 % | RESPIRATION RATE: 16 BRPM | SYSTOLIC BLOOD PRESSURE: 118 MMHG | HEART RATE: 63 BPM | TEMPERATURE: 97.6 F

## 2024-03-25 DIAGNOSIS — Z71.3 WEIGHT LOSS COUNSELING, ENCOUNTER FOR: ICD-10-CM

## 2024-03-25 DIAGNOSIS — Z12.31 VISIT FOR SCREENING MAMMOGRAM: ICD-10-CM

## 2024-03-25 DIAGNOSIS — G89.29 CHRONIC PAIN OF BOTH KNEES: ICD-10-CM

## 2024-03-25 DIAGNOSIS — M25.561 CHRONIC PAIN OF BOTH KNEES: ICD-10-CM

## 2024-03-25 DIAGNOSIS — M25.562 CHRONIC PAIN OF BOTH KNEES: ICD-10-CM

## 2024-03-25 DIAGNOSIS — Z00.00 ROUTINE GENERAL MEDICAL EXAMINATION AT A HEALTH CARE FACILITY: Primary | ICD-10-CM

## 2024-03-25 DIAGNOSIS — F33.1 MODERATE EPISODE OF RECURRENT MAJOR DEPRESSIVE DISORDER (H): ICD-10-CM

## 2024-03-25 DIAGNOSIS — L65.9 HAIR THINNING: ICD-10-CM

## 2024-03-25 DIAGNOSIS — Z12.4 CERVICAL CANCER SCREENING: ICD-10-CM

## 2024-03-25 DIAGNOSIS — Z12.11 SCREENING FOR COLON CANCER: ICD-10-CM

## 2024-03-25 LAB
ANION GAP SERPL CALCULATED.3IONS-SCNC: 11 MMOL/L (ref 7–15)
BUN SERPL-MCNC: 18.5 MG/DL (ref 6–20)
CALCIUM SERPL-MCNC: 9.8 MG/DL (ref 8.6–10)
CHLORIDE SERPL-SCNC: 105 MMOL/L (ref 98–107)
CHOLEST SERPL-MCNC: 233 MG/DL
CREAT SERPL-MCNC: 0.89 MG/DL (ref 0.51–0.95)
DEPRECATED HCO3 PLAS-SCNC: 24 MMOL/L (ref 22–29)
EGFRCR SERPLBLD CKD-EPI 2021: 75 ML/MIN/1.73M2
FASTING STATUS PATIENT QL REPORTED: NO
FERRITIN SERPL-MCNC: 271 NG/ML (ref 11–328)
GLUCOSE SERPL-MCNC: 83 MG/DL (ref 70–99)
HBA1C MFR BLD: 4.7 % (ref 0–5.6)
HDLC SERPL-MCNC: 45 MG/DL
LDLC SERPL CALC-MCNC: 161 MG/DL
NONHDLC SERPL-MCNC: 188 MG/DL
POTASSIUM SERPL-SCNC: 3.7 MMOL/L (ref 3.4–5.3)
SODIUM SERPL-SCNC: 140 MMOL/L (ref 135–145)
TRIGL SERPL-MCNC: 135 MG/DL
TSH SERPL DL<=0.005 MIU/L-ACNC: 1.68 UIU/ML (ref 0.3–4.2)

## 2024-03-25 PROCEDURE — 99396 PREV VISIT EST AGE 40-64: CPT | Performed by: FAMILY MEDICINE

## 2024-03-25 PROCEDURE — 82728 ASSAY OF FERRITIN: CPT | Performed by: FAMILY MEDICINE

## 2024-03-25 PROCEDURE — 99214 OFFICE O/P EST MOD 30 MIN: CPT | Mod: 25 | Performed by: FAMILY MEDICINE

## 2024-03-25 PROCEDURE — G0145 SCR C/V CYTO,THINLAYER,RESCR: HCPCS | Performed by: FAMILY MEDICINE

## 2024-03-25 PROCEDURE — 36415 COLL VENOUS BLD VENIPUNCTURE: CPT | Performed by: FAMILY MEDICINE

## 2024-03-25 PROCEDURE — 84443 ASSAY THYROID STIM HORMONE: CPT | Performed by: FAMILY MEDICINE

## 2024-03-25 PROCEDURE — 80061 LIPID PANEL: CPT | Performed by: FAMILY MEDICINE

## 2024-03-25 PROCEDURE — 80048 BASIC METABOLIC PNL TOTAL CA: CPT | Performed by: FAMILY MEDICINE

## 2024-03-25 PROCEDURE — 83036 HEMOGLOBIN GLYCOSYLATED A1C: CPT | Performed by: FAMILY MEDICINE

## 2024-03-25 PROCEDURE — 87624 HPV HI-RISK TYP POOLED RSLT: CPT | Performed by: FAMILY MEDICINE

## 2024-03-25 RX ORDER — TIRZEPATIDE 7.5 MG/.5ML
INJECTION, SOLUTION SUBCUTANEOUS
Qty: 3 ML | Refills: 1 | Status: SHIPPED | OUTPATIENT
Start: 2024-03-25 | End: 2024-05-06

## 2024-03-25 ASSESSMENT — PATIENT HEALTH QUESTIONNAIRE - PHQ9
SUM OF ALL RESPONSES TO PHQ QUESTIONS 1-9: 4
10. IF YOU CHECKED OFF ANY PROBLEMS, HOW DIFFICULT HAVE THESE PROBLEMS MADE IT FOR YOU TO DO YOUR WORK, TAKE CARE OF THINGS AT HOME, OR GET ALONG WITH OTHER PEOPLE: SOMEWHAT DIFFICULT
SUM OF ALL RESPONSES TO PHQ QUESTIONS 1-9: 4

## 2024-03-25 ASSESSMENT — ANXIETY QUESTIONNAIRES
GAD7 TOTAL SCORE: 5
IF YOU CHECKED OFF ANY PROBLEMS ON THIS QUESTIONNAIRE, HOW DIFFICULT HAVE THESE PROBLEMS MADE IT FOR YOU TO DO YOUR WORK, TAKE CARE OF THINGS AT HOME, OR GET ALONG WITH OTHER PEOPLE: SOMEWHAT DIFFICULT
8. IF YOU CHECKED OFF ANY PROBLEMS, HOW DIFFICULT HAVE THESE MADE IT FOR YOU TO DO YOUR WORK, TAKE CARE OF THINGS AT HOME, OR GET ALONG WITH OTHER PEOPLE?: SOMEWHAT DIFFICULT
1. FEELING NERVOUS, ANXIOUS, OR ON EDGE: SEVERAL DAYS
6. BECOMING EASILY ANNOYED OR IRRITABLE: NOT AT ALL
5. BEING SO RESTLESS THAT IT IS HARD TO SIT STILL: NOT AT ALL
GAD7 TOTAL SCORE: 5
GAD7 TOTAL SCORE: 5
3. WORRYING TOO MUCH ABOUT DIFFERENT THINGS: SEVERAL DAYS
2. NOT BEING ABLE TO STOP OR CONTROL WORRYING: SEVERAL DAYS
4. TROUBLE RELAXING: SEVERAL DAYS
7. FEELING AFRAID AS IF SOMETHING AWFUL MIGHT HAPPEN: SEVERAL DAYS
7. FEELING AFRAID AS IF SOMETHING AWFUL MIGHT HAPPEN: SEVERAL DAYS

## 2024-03-25 NOTE — PATIENT INSTRUCTIONS
"Two books by Dr Joaquina Sears:  Fast Like a Girl  The Menopause Reset    Dr. Juana Hernandes's book: How to Loose Weight for the Last Time: [employer] Solutions for Permanent Weight Loss  Also her Podcast: Weight loss for Busy Physicians *   *just insert your own career/barriers with any reference to busy physician world and you will still get phenomenal tips for the mindset changes needed for sustained weight loss.        Weight Loss Strategies for Success: (start small, pick 1 or 2 goals each week)    Stop snacking. Three meals a day only. This is THE MAJOR  for insulin resistance. We need to change the insulin resistance to have sustained weight loss.   Keep a food log- apps like [employer] are very helpful for this. (Not to pay attention to the calories but just to track the food).   Plan your food log in advance- Night prior: pick out what you want to eat for the next day for your 3 meals and stick to it: protein, healthy fat, veggie for example at every meal.  High protein (60g/day): Try to get protein in at least every 3.5 hours during the day to avoid a hunger surge late in the day.  If hungry, start with a protein serving, followed by water and then a crunchy vegetable that requires chewing (this decreases the hunger hormone).   Use plenty of healthy fats (olive oil, avocados, nuts) when cooking which will make you feel more satisfied.    Be intentional and think about what you are eating and feel the food fuel you.  Have a  \"Table, chair, plate\" with every meal. Sit down to eat your food!  Brush your teeth after the last meal of the day. Prevents evening snacking.   Avoid sugary beverages. (pop, fancy coffees). Reduce alcohol.   Drink water instead.   No fast food (or reduce meals out to a specific #/week). Eat at home 90% of the time.  Start the morning with breakfast.  Choose one day to be \"meal planning\" and/or \"meal prep\" days. Plan ahead for grocery shopping for healthy food choices, and spend " 30min-1hr each week prepping your fruits/veggies (wash, chop, store in easy grab portions).  Make it easy to grab the protein (cut up chicken breasts, greek yogurt containers, hard boiled eggs, etc)  Start low intensity exercise 30 minutes/day (walking/hiking/yard work).  Goal for 10,000 steps per day (consider a FitBit or other tracking device).  Daily weights can help- but just use it as a data point; don't get stuck in the thoughts around that number.       There is a new shingles vaccine that is 97% effective. It is the Shingrix vaccine and is recommended for those 50 and older. We recommend the vaccine even if you have had shingles or the older vaccine against shingles- Zostavax. Insurance coverage for the vaccine varies. I recommend you check with your insurance to verify if, when and where it is covered. The vaccine is covered by most commercial insurance but Medicare does not cover the vaccine. It may be covered by Medicare Part D (your drug/pharmacy plan) and sometimes it is covered only at a pharmacy instead of here in the clinic. If it is covered in the clinic, you may schedule a nurse visit anytime to get the first dose of the vaccine. The second dose is recommended two months after the first and should be gotten by 6 months after the first.   About 10% of people will get a sore, red reaction at the site of the injection. Some people may also feel a little sick or nauseated after the injection. This may happen with either the first and/or second vaccine.

## 2024-03-25 NOTE — PROGRESS NOTES
Assessment & Plan     Routine general medical examination at a health care facility  Overall doing well; reviewed medications and knee pain as below. Routine labs today.   - Adult Dermatology  Referral; Future  - Lipid panel reflex to direct LDL Non-fasting  - Basic metabolic panel  (Ca, Cl, CO2, Creat, Gluc, K, Na, BUN)  - Hemoglobin A1c    Weight loss counseling, encounter for  Has achieves a substantial 40+ pound weight loss with current BMI at 22.77.  At this point I do not recommend increasing the dose and she is in agreement.  I have sent a refill for maintenance Mounjaro 7.5 mg weekly.  Her insurance does not cover this and prior Auth has not been successful in the past so she is going to use a coupon from the  or pay out-of-pocket for the next month until the coupon works.    Strongly encouraged her to increase her physical activity for strength based training to prevent further muscle wasting  - tirzepatide (MOUNJARO) 7.5 MG/0.5ML pen; INJECT 7.5 MG SUBCUTANEOUS EVERY 7 DAYS    Hair thinning  May be secondary to to the GYN effluvium after substantial weight loss.  Checking iron level and thyroid function  - Adult Dermatology  Referral; Future  - Ferritin  - TSH with free T4 reflex    Moderate episode of recurrent major depressive disorder (H)  Doing well with Celexa 30 mg daily at this time.  Declines med adjustment or therapy need.    Chronic pain of both knees for 2 years  Ongoing chronic pain with difficulty getting into or out of a squatting position.  Will start with x-rays and a physical therapy referral.  - XR Knee Bilateral 3 Views; Future  - Physical Therapy  Referral; Future    Visit for screening mammogram  - MA SCREENING DIGITAL BILAT - Future  (s+30); Future    Cervical cancer screening  - Pap screen with HPV - recommended age 30 - 65 years    Screening for colon cancer  - Colonoscopy Screening  Referral; Future                  Subjective  "  Kaley is a 58 year old, presenting for the following health issues:  Medication check       She is really liking the Mounjaro- she finished the 7.5mg weekly dose; hasn't been able to get the 10mg dose due to insurance/cost: has lost at least 40lbs she thinks. Up and down with energy. She is feeling well overall. She has been able to change her eating habits and lifestyle changes.     She would like to continue the 7.5mg weekly dose for now (she anticipates being able to extend this dose a little longer for cost savings).   She anticipates the PA not being covered/insurance doesn't cover.   Wt Readings from Last 3 Encounters:   03/25/24 69.9 kg (154 lb 3.2 oz)   10/13/23 93 kg (205 lb 1.6 oz)   08/16/22 87.5 kg (192 lb 14.4 oz)     Mood: doing well on celexa 30mg daily; does have some mild daily anxiety/depression but \"it's part of who I am\" and doesn't want a dose increase.      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  Patient's anxiety since last visit has been:  No change  The patient is not having other symptoms associated with anxiety.  Any significant life events: job concerns  Patient is feeling anxious or having panic attacks.  Patient has no concerns about alcohol or drug use.    Reason for visit:  General followup    She eats 2-3 servings of fruits and vegetables daily.She consumes 0 sweetened beverage(s) daily.She exercises with enough effort to increase her heart rate 9 or less minutes per day.  She exercises with enough effort to increase her heart rate 3 or less " "days per week. She is missing 1 dose(s) of medications per week.         6/22/2021     3:00 PM 12/8/2021    11:59 AM 3/25/2024     2:12 PM   NEENA-7 SCORE   Total Score   5 (mild anxiety)   Total Score 6 9 5            12/8/2021    11:59 AM 8/16/2022     3:00 PM 3/25/2024     2:11 PM   PHQ   PHQ-9 Total Score 4 14 4   Q9: Thoughts of better off dead/self-harm past 2 weeks Not at all Not at all Not at all          Colonoscopy due 5 yr from 2017; hasn't yet had updated      Pap smear due:    4/22/10 NIL  9/13/13 NIL  3/23/17 ASCUS, +HR HPV 16  4/17/17 Colpo bx inflammation, reactive changes, cells suggestive of HPV effect  3/10/20 ASCUS, +HR HPV 16 & other. Reserve deferred due to COVID  6/22/21 Colpo bx and ECC immature squamous metaplasia favor dysplastic.   ASCUS pap, +HR HPV (not 16/18). Plan: cotest in 1 year  6/29/21 Pt notified   10/6/22 Lost to follow-up for pap tracking, fyi routed to provider                         Objective    /66   Pulse 63   Temp 97.6  F (36.4  C) (Oral)   Resp 16   Ht 1.753 m (5' 9\")   Wt 69.9 kg (154 lb 3.2 oz)   SpO2 99%   BMI 22.77 kg/m    Body mass index is 22.77 kg/m .  Physical Exam   GENERAL: alert and no distress  EYES: Eyes grossly normal to inspection, PERRL and conjunctivae and sclerae normal  HENT: ear canals and TM's normal, nose and mouth without ulcers or lesions  NECK: no adenopathy, no asymmetry, masses, or scars  RESP: lungs clear to auscultation - no rales, rhonchi or wheezes  CV: regular rate and rhythm, normal S1 S2, no S3 or S4, no murmur, click or rub, no peripheral edema  ABDOMEN: soft, nontender, no hepatosplenomegaly, no masses and bowel sounds normal  MS: no gross musculoskeletal defects noted, no edema  SKIN: no suspicious lesions or rashes  NEURO: Normal strength and tone, mentation intact and speech normal  PSYCH: mentation appears normal, affect normal/bright  : Normal external genitalia with Normal vulva.  Normal vagina with no polyps or lesions " and with physiologic discharge.  Normal cervix with normal mucosa and without CMT.  No adnexal masses              Signed Electronically by: Leatha Bernal MD

## 2024-03-25 NOTE — TELEPHONE ENCOUNTER
Retail Pharmacy Prior Authorization Team   Phone: [phone]      .PRIOR AUTHORIZATION DENIED    Medication: MOUNJARO 7.5 MG/0.5ML SC SOPN  Insurance Company: [employer] - Phone [phone] Fax [phone]  Denial Date: 3/25/2024  Denial Reason(s): Document:  Decision Notes: This request cannot be approved because this drug is being used for weight loss. Drugs used for this purpose are excluded from coverage as stated in your benefit summary. Please look at the list of covered drugs, also known as the formulary, to see what is covered by your plan. Your doctor or health care provider may be able to suggest other treatments for your health issue.           Appeal Information: If the Provider/Patient would like to appeal the denial, a letter of medical necessity would need to be written to send to the insurance company stating why Mounjaro should be used for an off-label use.  The prescriber can always prescribe Zepbound which is the counterpart for use in obese patients for weight loss.  It was FDA approved for weight loss.       Patient Notified: No. The [employer] [facility] PA Team does not notify of the denial outcomes as the patient often will ask what their provider will prescribe in place of the denied medication, or additional information in regards to other therapies they can take in place of the denied medication.  This is not something we can advise on in our department

## 2024-03-26 ENCOUNTER — PATIENT OUTREACH (OUTPATIENT)
Dept: GASTROENTEROLOGY | Facility: CLINIC | Age: 59
End: 2024-03-26
Payer: COMMERCIAL

## 2024-03-27 LAB
BKR LAB AP GYN ADEQUACY: NORMAL
BKR LAB AP GYN INTERPRETATION: NORMAL
BKR LAB AP HPV REFLEX: NORMAL
BKR LAB AP PREVIOUS ABNL DX: NORMAL
BKR LAB AP PREVIOUS ABNORMAL: NORMAL
PATH REPORT.COMMENTS IMP SPEC: NORMAL
PATH REPORT.COMMENTS IMP SPEC: NORMAL
PATH REPORT.RELEVANT HX SPEC: NORMAL

## 2024-03-27 NOTE — TELEPHONE ENCOUNTER
Her insurance does not cover this and prior Auth has not been successful in the past so she is going to use a coupon from the  or pay out-of-pocket for the next month until the coupon works.

## 2024-03-28 PROBLEM — R87.810 ASCUS WITH POSITIVE HIGH RISK HPV CERVICAL: Status: ACTIVE | Noted: 2018-12-02

## 2024-03-28 PROBLEM — R87.610 ASCUS WITH POSITIVE HIGH RISK HPV CERVICAL: Status: ACTIVE | Noted: 2018-12-02

## 2024-03-28 LAB
HUMAN PAPILLOMA VIRUS 16 DNA: NEGATIVE
HUMAN PAPILLOMA VIRUS 18 DNA: NEGATIVE
HUMAN PAPILLOMA VIRUS FINAL DIAGNOSIS: ABNORMAL
HUMAN PAPILLOMA VIRUS OTHER HR: POSITIVE

## 2024-03-29 ENCOUNTER — PATIENT OUTREACH (OUTPATIENT)
Dept: FAMILY MEDICINE | Facility: CLINIC | Age: 59
End: 2024-03-29
Payer: COMMERCIAL

## 2024-04-01 RX ORDER — TIRZEPATIDE 7.5 MG/.5ML
INJECTION, SOLUTION SUBCUTANEOUS
Refills: 1 | OUTPATIENT
Start: 2024-04-01

## 2024-04-04 ENCOUNTER — ANCILLARY PROCEDURE (OUTPATIENT)
Dept: GENERAL RADIOLOGY | Facility: CLINIC | Age: 59
End: 2024-04-04
Attending: FAMILY MEDICINE
Payer: COMMERCIAL

## 2024-04-04 DIAGNOSIS — M25.561 CHRONIC PAIN OF BOTH KNEES: ICD-10-CM

## 2024-04-04 DIAGNOSIS — G89.29 CHRONIC PAIN OF BOTH KNEES: ICD-10-CM

## 2024-04-04 DIAGNOSIS — M25.562 CHRONIC PAIN OF BOTH KNEES: ICD-10-CM

## 2024-04-04 PROCEDURE — 73562 X-RAY EXAM OF KNEE 3: CPT | Mod: TC | Performed by: RADIOLOGY

## 2024-05-06 ENCOUNTER — MYC MEDICAL ADVICE (OUTPATIENT)
Dept: FAMILY MEDICINE | Facility: CLINIC | Age: 59
End: 2024-05-06
Payer: COMMERCIAL

## 2024-05-06 DIAGNOSIS — Z71.3 WEIGHT LOSS COUNSELING, ENCOUNTER FOR: ICD-10-CM

## 2024-05-06 RX ORDER — TIRZEPATIDE 7.5 MG/.5ML
INJECTION, SOLUTION SUBCUTANEOUS
Qty: 3 ML | Refills: 1 | Status: SHIPPED | OUTPATIENT
Start: 2024-05-06 | End: 2024-07-16

## 2024-06-07 DIAGNOSIS — F33.1 MODERATE EPISODE OF RECURRENT MAJOR DEPRESSIVE DISORDER (H): ICD-10-CM

## 2024-06-07 DIAGNOSIS — F41.9 ANXIETY: ICD-10-CM

## 2024-06-07 RX ORDER — CITALOPRAM HYDROBROMIDE 20 MG/1
30 TABLET ORAL DAILY
Qty: 135 TABLET | Refills: 0 | Status: SHIPPED | OUTPATIENT
Start: 2024-06-07

## 2024-07-16 ENCOUNTER — VIRTUAL VISIT (OUTPATIENT)
Dept: FAMILY MEDICINE | Facility: CLINIC | Age: 59
End: 2024-07-16
Payer: COMMERCIAL

## 2024-07-16 DIAGNOSIS — Z71.3 WEIGHT LOSS COUNSELING, ENCOUNTER FOR: ICD-10-CM

## 2024-07-16 PROCEDURE — 99443 PR PHYSICIAN TELEPHONE EVALUATION 21-30 MIN: CPT | Mod: 93 | Performed by: FAMILY MEDICINE

## 2024-07-16 RX ORDER — TIRZEPATIDE 7.5 MG/.5ML
INJECTION, SOLUTION SUBCUTANEOUS
Qty: 3 ML | Refills: 1 | Status: SHIPPED | OUTPATIENT
Start: 2024-07-16

## 2024-07-16 NOTE — PROGRESS NOTES
Kaley is a 58 year old who is being evaluated via a billable video visit.    How would you like to obtain your AVS? MyChart  If the video visit is dropped, the invitation should be resent by: Text to cell phone: [phone]  Will anyone else be joining your video visit? No      Assessment & Plan     Weight loss counseling, encounter for  .Strength training program emphasized at this time  Continues tirzepatide 7.5mg every 10-12 days for weight maintenance and notices increased appetite/weight if she trys to space. Not  yet ready to wean down the dose.     Also inquired about referrals for colonoscopy to [facility] which were placed in March and doesn't recall getting contacted. Will fax referrals.     Follow-up 4 months    The longitudinal plan of care for the diagnosis(es)/condition(s) as documented were addressed during this visit. Due to the added complexity in care, I will continue to support Kaley in the subsequent management and with ongoing continuity of care.    Phone call duration: 27 minutes       Subjective   Kaley is a 58 year old, presenting for the following health issues:  Medication Therapy Management (Mounjaro and Citalopram )        7/16/2024    10:59 AM   Additional Questions   Roomed by RENUKA Jordan     HPI     Weight loss journey: last visit in March BMI 22 on Mounjaro 7.5mg weekly. Since then, she found the [facility] pharmacy has been the best option for access.  She takes it about every 10-12 days to maintain weight.   Weight is still about 150lbs (154lbs at  her physical in the spring with winter clothes/shoes).  She feels normal clothing size. She is working to regain muscle.   Less daily steps at her current job so working in increased walking/biking this summer. No specific weight training yet.     In total down about 40lbs.   She is feeling well overall.   She has been able to change her eating habits and lifestyle changes.      She would like to continue the  7.5mg weekly dose for now (she anticipates being able to extend this dose a little longer for cost savings).   She anticipates the PA not being covered/insurance doesn't cover.     Doing well on the celaxa 20mg for now.    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 Drinks 1-3 alcoholic beverages a week.  Daughters nearby- young grandson that she gets to see regularily.  Leatha Bernal MD                       Objective       Reported weight 150lbs today      Vitals:  No vitals were obtained today due to virtual visit.    Physical Exam   GENERAL: alert and no distress  PSYCH: Appropriate affect, tone, and pace of words          Video-Visit Details--> changed to phone as pt's camera/ozzie not able to be connected again this visit, attempted doximity and Amwell    Type of service:  Video Visit   Originating Location (pt. Location): Home  Distant Location (provider location):  On-site  Platform used for Video Visit: Patricia  Signed Electronically by: Leatha Bernal MD

## 2024-09-13 ENCOUNTER — TRANSFERRED RECORDS (OUTPATIENT)
Dept: HEALTH INFORMATION MANAGEMENT | Facility: CLINIC | Age: 59
End: 2024-09-13
Payer: COMMERCIAL

## 2024-09-26 ENCOUNTER — TELEPHONE (OUTPATIENT)
Dept: PHARMACY | Facility: CLINIC | Age: 59
End: 2024-09-26
Payer: COMMERCIAL

## 2024-09-26 NOTE — TELEPHONE ENCOUNTER
Unable to schedule patient for MTM follow-up after multiple attempts. Will remove myself from care team.    Michelle Mcconnell, PharmD  Medication Therapy Management (MTM) Pharmacist

## 2024-09-28 ENCOUNTER — HEALTH MAINTENANCE LETTER (OUTPATIENT)
Age: 59
End: 2024-09-28

## 2024-11-03 ENCOUNTER — MYC REFILL (OUTPATIENT)
Dept: FAMILY MEDICINE | Facility: CLINIC | Age: 59
End: 2024-11-03
Payer: COMMERCIAL

## 2024-11-03 DIAGNOSIS — F41.9 ANXIETY: ICD-10-CM

## 2024-11-03 DIAGNOSIS — F33.1 MODERATE EPISODE OF RECURRENT MAJOR DEPRESSIVE DISORDER (H): ICD-10-CM

## 2024-11-05 RX ORDER — CITALOPRAM HYDROBROMIDE 20 MG/1
30 TABLET ORAL DAILY
Qty: 135 TABLET | Refills: 0 | Status: SHIPPED | OUTPATIENT
Start: 2024-11-05

## 2024-12-04 DIAGNOSIS — Z71.3 WEIGHT LOSS COUNSELING, ENCOUNTER FOR: ICD-10-CM

## 2024-12-04 RX ORDER — TIRZEPATIDE 7.5 MG/.5ML
7.5 INJECTION, SOLUTION SUBCUTANEOUS
Qty: 3 ML | Refills: 1 | Status: SHIPPED | OUTPATIENT
Start: 2024-12-04

## 2025-02-24 ENCOUNTER — PATIENT OUTREACH (OUTPATIENT)
Dept: CARE COORDINATION | Facility: CLINIC | Age: 60
End: 2025-02-24
Payer: COMMERCIAL

## 2025-03-06 ENCOUNTER — PATIENT OUTREACH (OUTPATIENT)
Dept: FAMILY MEDICINE | Facility: CLINIC | Age: 60
End: 2025-03-06
Payer: COMMERCIAL

## 2025-03-06 PROBLEM — R87.810 ASCUS WITH POSITIVE HIGH RISK HPV CERVICAL: Status: ACTIVE | Noted: 2018-12-02

## 2025-03-06 PROBLEM — R87.610 ASCUS WITH POSITIVE HIGH RISK HPV CERVICAL: Status: ACTIVE | Noted: 2018-12-02

## 2025-03-10 ENCOUNTER — PATIENT OUTREACH (OUTPATIENT)
Dept: CARE COORDINATION | Facility: CLINIC | Age: 60
End: 2025-03-10
Payer: COMMERCIAL

## 2025-03-21 ENCOUNTER — TELEPHONE (OUTPATIENT)
Dept: FAMILY MEDICINE | Facility: CLINIC | Age: 60
End: 2025-03-21
Payer: COMMERCIAL

## 2025-03-24 NOTE — TELEPHONE ENCOUNTER
Note: Due to record-high volumes, our turn-around time is taking longer than usual . We are currently 4  business days behind in the pools.   We are working diligently to submit all requests in a timely manner and in the order they are received. Please only flag TRUE URGENT requests as high priority to the pool at this time.   If you have questions on status of PA's,  please send a note/message in the active PA encounter and send back to the [facility] PA pool [[phone]].    If you have questions about the turn-around time or about our process, please reach out to our supervisor Nikki No.   Thank you!   RPPA (Retail Pharmacy Prior Authorization) team    Retail Pharmacy Prior Authorization Team   Phone: [phone]    CMM KEY:  (Key: [id number])    PA Initiation    Medication: MOUNJARO 7.5 MG/0.5ML SC SOAJ  Insurance Company: [employer] Non-Specialty PA's - Phone [phone] Fax [phone]  Pharmacy Filling the Rx: [facility] [place], [place] - [address] [facility]  Filling Pharmacy Phone: [phone]  Filling Pharmacy Fax:    Start Date: 3/24/2025

## 2025-03-25 NOTE — TELEPHONE ENCOUNTER
Retail Pharmacy Prior Authorization Team   Phone: [phone]      PRIOR AUTHORIZATION DENIED    Medication: MOUNJARO 7.5 MG/0.5ML SC SOAJ  Insurance Company: [employer] Non-Specialty PA's - Phone [phone] Fax [phone]  Denial Date: 3/24/2025  Denial Reason(s):       Appeal Information: In order to send an appeal to the patient's insurance, confirmation that patient has the FDA Approved diagnosis of Type II DM will be needed since the diagnosis of Obesity is considered an off-label/non-fda approved use and therefore is not covered under the patient's insurance.    Patient Notified: No. The [employer] [facility] PA Team does not notify of the denial outcomes as the patient often will ask what their provider will prescribe in place of the denied medication, or additional information in regards to other therapies they can take in place of the denied medication.  This is not something we can advise on in our department.

## 2025-04-08 NOTE — TELEPHONE ENCOUNTER
04/08/25  LM for pt to return call for message. Sent [employer] message with PA decision.  Keila

## 2025-04-26 ENCOUNTER — HEALTH MAINTENANCE LETTER (OUTPATIENT)
Age: 60
End: 2025-04-26

## 2025-05-05 ENCOUNTER — MYC REFILL (OUTPATIENT)
Dept: FAMILY MEDICINE | Facility: CLINIC | Age: 60
End: 2025-05-05
Payer: COMMERCIAL

## 2025-05-05 DIAGNOSIS — F41.9 ANXIETY: ICD-10-CM

## 2025-05-05 DIAGNOSIS — F33.1 MODERATE EPISODE OF RECURRENT MAJOR DEPRESSIVE DISORDER (H): ICD-10-CM

## 2025-05-06 NOTE — TELEPHONE ENCOUNTER
Clinic RN: Please investigate patient's chart or contact patient if the information cannot be found because patient should have run out of this medication on 02/2025. Confirm patient is taking this medication as prescribed. Document findings and route refill encounter to provider for approval or denial.

## 2025-05-19 NOTE — TELEPHONE ENCOUNTER
Contacts       Contact Date/Time Type Contact Phone/Fax    05/05/2025 08:57 PM CDT Web (Incoming) Kaley Hernandez (Self)     05/19/2025 12:40 PM CDT Phone (Outgoing) Kaley Hernandez (Self) [phone] (M)    Left Message           Attempted to reach patient to: Collect additional information    Information needed: Patient request citalopram refill. Chart shows patient should have been out of medication 02/2025.    Is patient out of medication? If so, for how long?  Are they taking citalopram 20 mg tablets; take 1.5 tablets (30 mg) by mouth daily as prescribed?    When patient returns call, please take this action: Transfer to RN Green line.    Lou Longoria RN

## 2025-05-23 NOTE — TELEPHONE ENCOUNTER
Contacts       Contact Date/Time Type Contact Phone/Fax    05/05/2025 08:57 PM CDT Web (Incoming) Kaley Hernandez (Self)     05/19/2025 12:40 PM CDT Phone (Outgoing) Kaley Hernandez (Self) [phone] (M)    Left Message     05/23/2025 01:23 PM CDT Phone (Outgoing) Kaley Hernandez (Self) [phone] (M)    Left Message           Attempted to reach patient to: Collect additional information. No answer. Left message to call back.     Information needed:  Patient requested citalopram refill. Chart shows patient should have been out of medication 02/2025.     Is patient out of medication? If so, for how long?  Are they taking citalopram 20 mg tablets; take 1.5 tablets (30 mg) by mouth daily as prescribed?    When patient returns call, please take this action: Transfer to RN Green Line

## 2025-05-27 RX ORDER — CITALOPRAM HYDROBROMIDE 20 MG/1
30 TABLET ORAL DAILY
Qty: 135 TABLET | Refills: 0 | OUTPATIENT
Start: 2025-05-27

## 2025-05-27 NOTE — TELEPHONE ENCOUNTER
Contacts       Contact Date/Time Type Contact Phone/Fax    05/05/2025 08:57 PM CDT Web (Incoming) Kaley Hernandez (Self)     05/19/2025 12:40 PM CDT Phone (Outgoing) Kaley Hernandez (Self) [phone] (M)    Left Message     05/23/2025 01:23 PM CDT Phone (Outgoing) Kaley Hernandez (Self) [phone] (M)    Left Message     05/27/2025 04:15 PM CDT Phone (Outgoing) Kaley Hernandez (Self) [phone] (M)    Left Message           Attempted to reach patient to: Collect additional information. No answer. Left message to call back.      Information needed:  Patient requested citalopram refill. Chart shows patient should have been out of medication 02/2025.     Is patient out of medication? If so, for how long?  Are they taking citalopram 20 mg tablets; take 1.5 tablets (30 mg) by mouth daily as prescribed?     When patient returns call, please take this action: Transfer to TOI Longoria RN

## 2025-06-06 ENCOUNTER — MYC REFILL (OUTPATIENT)
Dept: FAMILY MEDICINE | Facility: CLINIC | Age: 60
End: 2025-06-06
Payer: COMMERCIAL

## 2025-06-06 DIAGNOSIS — F41.9 ANXIETY: ICD-10-CM

## 2025-06-06 DIAGNOSIS — F33.1 MODERATE EPISODE OF RECURRENT MAJOR DEPRESSIVE DISORDER (H): ICD-10-CM

## 2025-06-09 RX ORDER — CITALOPRAM HYDROBROMIDE 20 MG/1
30 TABLET ORAL DAILY
Qty: 135 TABLET | Refills: 0 | Status: SHIPPED | OUTPATIENT
Start: 2025-06-09

## 2025-08-13 DIAGNOSIS — Z71.3 WEIGHT LOSS COUNSELING, ENCOUNTER FOR: ICD-10-CM

## 2025-08-13 DIAGNOSIS — F33.1 MODERATE EPISODE OF RECURRENT MAJOR DEPRESSIVE DISORDER (H): ICD-10-CM

## 2025-08-13 DIAGNOSIS — F41.9 ANXIETY: ICD-10-CM

## 2025-08-13 RX ORDER — CITALOPRAM HYDROBROMIDE 20 MG/1
30 TABLET ORAL DAILY
Qty: 50 TABLET | Refills: 0 | Status: SHIPPED | OUTPATIENT
Start: 2025-08-13

## 2025-08-18 RX ORDER — TIRZEPATIDE 7.5 MG/.5ML
7.5 INJECTION, SOLUTION SUBCUTANEOUS
Qty: 2 ML | Refills: 1 | Status: SHIPPED | OUTPATIENT
Start: 2025-08-18

## 2025-09-04 ENCOUNTER — OFFICE VISIT (OUTPATIENT)
Dept: FAMILY MEDICINE | Facility: CLINIC | Age: 60
End: 2025-09-04
Payer: COMMERCIAL

## 2025-09-04 VITALS
WEIGHT: 141.1 LBS | BODY MASS INDEX: 20.9 KG/M2 | HEIGHT: 69 IN | DIASTOLIC BLOOD PRESSURE: 65 MMHG | OXYGEN SATURATION: 100 % | SYSTOLIC BLOOD PRESSURE: 104 MMHG | RESPIRATION RATE: 16 BRPM | HEART RATE: 67 BPM

## 2025-09-04 DIAGNOSIS — Z00.00 ROUTINE GENERAL MEDICAL EXAMINATION AT A HEALTH CARE FACILITY: Primary | ICD-10-CM

## 2025-09-04 DIAGNOSIS — Z82.49 FAMILY HISTORY OF ISCHEMIC HEART DISEASE: ICD-10-CM

## 2025-09-04 DIAGNOSIS — N39.46 MIXED INCONTINENCE URGE AND STRESS (MALE)(FEMALE): ICD-10-CM

## 2025-09-04 DIAGNOSIS — F33.1 MODERATE EPISODE OF RECURRENT MAJOR DEPRESSIVE DISORDER (H): ICD-10-CM

## 2025-09-04 DIAGNOSIS — N95.1 VASOMOTOR SYMPTOMS DUE TO MENOPAUSE: ICD-10-CM

## 2025-09-04 DIAGNOSIS — R87.610 ASCUS WITH POSITIVE HIGH RISK HPV CERVICAL: ICD-10-CM

## 2025-09-04 DIAGNOSIS — N95.2 ATROPHIC VAGINITIS: ICD-10-CM

## 2025-09-04 DIAGNOSIS — E66.812 CLASS 2 OBESITY DUE TO EXCESS CALORIES WITHOUT SERIOUS COMORBIDITY WITH BODY MASS INDEX (BMI) OF 35.0 TO 35.9 IN ADULT: ICD-10-CM

## 2025-09-04 DIAGNOSIS — F41.9 ANXIETY: ICD-10-CM

## 2025-09-04 DIAGNOSIS — Z12.31 VISIT FOR SCREENING MAMMOGRAM: ICD-10-CM

## 2025-09-04 DIAGNOSIS — Z12.4 CERVICAL CANCER SCREENING: ICD-10-CM

## 2025-09-04 DIAGNOSIS — H90.A21 SENSORINEURAL HEARING LOSS (SNHL) OF RIGHT EAR WITH RESTRICTED HEARING OF LEFT EAR: ICD-10-CM

## 2025-09-04 DIAGNOSIS — E66.09 CLASS 2 OBESITY DUE TO EXCESS CALORIES WITHOUT SERIOUS COMORBIDITY WITH BODY MASS INDEX (BMI) OF 35.0 TO 35.9 IN ADULT: ICD-10-CM

## 2025-09-04 DIAGNOSIS — R87.810 ASCUS WITH POSITIVE HIGH RISK HPV CERVICAL: ICD-10-CM

## 2025-09-04 PROBLEM — H90.5 SENSORINEURAL HEARING LOSS: Status: RESOLVED | Noted: 2025-09-04 | Resolved: 2025-09-04

## 2025-09-04 PROBLEM — Z86.0100 HISTORY OF COLONIC POLYPS: Status: ACTIVE | Noted: 2017-03-30

## 2025-09-04 PROBLEM — H93.19 TINNITUS: Status: ACTIVE | Noted: 2025-09-04

## 2025-09-04 PROBLEM — D12.0 BENIGN NEOPLASM OF CECUM: Status: ACTIVE | Noted: 2017-04-03

## 2025-09-04 PROBLEM — M25.562 PAIN, JOINT, KNEE, LEFT: Status: ACTIVE | Noted: 2022-02-28

## 2025-09-04 PROBLEM — H90.5 SENSORINEURAL HEARING LOSS: Status: ACTIVE | Noted: 2025-09-04

## 2025-09-04 PROBLEM — R42 DIZZINESS: Status: ACTIVE | Noted: 2025-09-04

## 2025-09-04 LAB
ERYTHROCYTE [DISTWIDTH] IN BLOOD BY AUTOMATED COUNT: 13 % (ref 10–15)
EST. AVERAGE GLUCOSE BLD GHB EST-MCNC: 88 MG/DL
HBA1C MFR BLD: 4.7 % (ref 0–5.6)
HCT VFR BLD AUTO: 38.4 % (ref 35–47)
HGB BLD-MCNC: 13.1 G/DL (ref 11.7–15.7)
MCH RBC QN AUTO: 30 PG (ref 26.5–33)
MCHC RBC AUTO-ENTMCNC: 34.1 G/DL (ref 31.5–36.5)
MCV RBC AUTO: 88.1 FL (ref 78–100)
PLATELET # BLD AUTO: 201 10E3/UL (ref 150–450)
RBC # BLD AUTO: 4.36 10E6/UL (ref 3.8–5.2)
WBC # BLD AUTO: 5.37 10E3/UL (ref 4–11)

## 2025-09-04 RX ORDER — CITALOPRAM HYDROBROMIDE 20 MG/1
30 TABLET ORAL DAILY
Qty: 135 TABLET | Refills: 3 | Status: SHIPPED | OUTPATIENT
Start: 2025-09-04

## 2025-09-04 RX ORDER — ESTRADIOL 0.1 MG/G
2 CREAM VAGINAL
Qty: 42.5 G | Refills: 4 | Status: SHIPPED | OUTPATIENT
Start: 2025-09-04

## 2025-09-04 SDOH — HEALTH STABILITY: PHYSICAL HEALTH: ON AVERAGE, HOW MANY DAYS PER WEEK DO YOU ENGAGE IN MODERATE TO STRENUOUS EXERCISE (LIKE A BRISK WALK)?: 3 DAYS

## 2025-09-04 SDOH — HEALTH STABILITY: PHYSICAL HEALTH: ON AVERAGE, HOW MANY MINUTES DO YOU ENGAGE IN EXERCISE AT THIS LEVEL?: 30 MIN

## 2025-09-04 ASSESSMENT — PATIENT HEALTH QUESTIONNAIRE - PHQ9
10. IF YOU CHECKED OFF ANY PROBLEMS, HOW DIFFICULT HAVE THESE PROBLEMS MADE IT FOR YOU TO DO YOUR WORK, TAKE CARE OF THINGS AT HOME, OR GET ALONG WITH OTHER PEOPLE: SOMEWHAT DIFFICULT
SUM OF ALL RESPONSES TO PHQ QUESTIONS 1-9: 10
SUM OF ALL RESPONSES TO PHQ QUESTIONS 1-9: 10